# Patient Record
Sex: FEMALE | Race: WHITE | NOT HISPANIC OR LATINO | Employment: OTHER | URBAN - METROPOLITAN AREA
[De-identification: names, ages, dates, MRNs, and addresses within clinical notes are randomized per-mention and may not be internally consistent; named-entity substitution may affect disease eponyms.]

---

## 2017-02-12 ENCOUNTER — APPOINTMENT (EMERGENCY)
Dept: RADIOLOGY | Facility: HOSPITAL | Age: 72
End: 2017-02-12
Payer: MEDICARE

## 2017-02-12 ENCOUNTER — GENERIC CONVERSION - ENCOUNTER (OUTPATIENT)
Dept: OTHER | Facility: OTHER | Age: 72
End: 2017-02-12

## 2017-02-12 ENCOUNTER — HOSPITAL ENCOUNTER (OUTPATIENT)
Facility: HOSPITAL | Age: 72
Setting detail: OBSERVATION
Discharge: HOME | End: 2017-02-13
Attending: EMERGENCY MEDICINE | Admitting: INTERNAL MEDICINE
Payer: MEDICARE

## 2017-02-12 DIAGNOSIS — R07.9 CHEST PAIN: Primary | ICD-10-CM

## 2017-02-12 PROBLEM — E78.5 HLD (HYPERLIPIDEMIA): Status: ACTIVE | Noted: 2017-02-12

## 2017-02-12 PROBLEM — I10 ESSENTIAL HYPERTENSION: Status: ACTIVE | Noted: 2017-02-12

## 2017-02-12 PROBLEM — R07.89 CHEST TIGHTNESS: Status: ACTIVE | Noted: 2017-02-12

## 2017-02-12 PROBLEM — Z90.5 S/P NEPHRECTOMY: Status: ACTIVE | Noted: 2017-02-12

## 2017-02-12 PROBLEM — K21.9 GERD (GASTROESOPHAGEAL REFLUX DISEASE): Status: ACTIVE | Noted: 2017-02-12

## 2017-02-12 LAB
ANION GAP SERPL CALCULATED.3IONS-SCNC: 9 MMOL/L (ref 4–13)
BASOPHILS # BLD AUTO: 0.04 THOUSANDS/ΜL (ref 0–0.1)
BASOPHILS NFR BLD AUTO: 1 % (ref 0–1)
BUN SERPL-MCNC: 29 MG/DL (ref 5–25)
CALCIUM SERPL-MCNC: 8.8 MG/DL (ref 8.3–10.1)
CHLORIDE SERPL-SCNC: 108 MMOL/L (ref 100–108)
CO2 SERPL-SCNC: 26 MMOL/L (ref 21–32)
CREAT SERPL-MCNC: 1.32 MG/DL (ref 0.6–1.3)
EOSINOPHIL # BLD AUTO: 0.13 THOUSAND/ΜL (ref 0–0.61)
EOSINOPHIL NFR BLD AUTO: 3 % (ref 0–6)
ERYTHROCYTE [DISTWIDTH] IN BLOOD BY AUTOMATED COUNT: 13.7 % (ref 11.6–15.1)
GFR SERPL CREATININE-BSD FRML MDRD: 39.7 ML/MIN/1.73SQ M
GLUCOSE SERPL-MCNC: 116 MG/DL (ref 65–140)
HCT VFR BLD AUTO: 39.5 % (ref 34.8–46.1)
HGB BLD-MCNC: 13.1 G/DL (ref 11.5–15.4)
LYMPHOCYTES # BLD AUTO: 1.86 THOUSANDS/ΜL (ref 0.6–4.47)
LYMPHOCYTES NFR BLD AUTO: 42 % (ref 14–44)
MCH RBC QN AUTO: 30.7 PG (ref 26.8–34.3)
MCHC RBC AUTO-ENTMCNC: 33.2 G/DL (ref 31.4–37.4)
MCV RBC AUTO: 93 FL (ref 82–98)
MONOCYTES # BLD AUTO: 0.27 THOUSAND/ΜL (ref 0.17–1.22)
MONOCYTES NFR BLD AUTO: 6 % (ref 4–12)
NEUTROPHILS # BLD AUTO: 2.1 THOUSANDS/ΜL (ref 1.85–7.62)
NEUTS SEG NFR BLD AUTO: 48 % (ref 43–75)
NRBC BLD AUTO-RTO: 0 /100 WBCS
PLATELET # BLD AUTO: 268 THOUSANDS/UL (ref 149–390)
PLATELET # BLD AUTO: 273 THOUSANDS/UL (ref 149–390)
PMV BLD AUTO: 10.1 FL (ref 8.9–12.7)
PMV BLD AUTO: 10.3 FL (ref 8.9–12.7)
POTASSIUM SERPL-SCNC: 3.9 MMOL/L (ref 3.5–5.3)
RBC # BLD AUTO: 4.27 MILLION/UL (ref 3.81–5.12)
SODIUM SERPL-SCNC: 143 MMOL/L (ref 136–145)
SPECIMEN SOURCE: NORMAL
TROPONIN I BLD-MCNC: 0.01 NG/ML (ref 0–0.08)
TROPONIN I SERPL-MCNC: <0.02 NG/ML
TROPONIN I SERPL-MCNC: <0.02 NG/ML
WBC # BLD AUTO: 4.4 THOUSAND/UL (ref 4.31–10.16)

## 2017-02-12 PROCEDURE — 80048 BASIC METABOLIC PNL TOTAL CA: CPT

## 2017-02-12 PROCEDURE — 36415 COLL VENOUS BLD VENIPUNCTURE: CPT

## 2017-02-12 PROCEDURE — 85025 COMPLETE CBC W/AUTO DIFF WBC: CPT | Performed by: EMERGENCY MEDICINE

## 2017-02-12 PROCEDURE — 85049 AUTOMATED PLATELET COUNT: CPT | Performed by: INTERNAL MEDICINE

## 2017-02-12 PROCEDURE — 84484 ASSAY OF TROPONIN QUANT: CPT | Performed by: INTERNAL MEDICINE

## 2017-02-12 PROCEDURE — 84484 ASSAY OF TROPONIN QUANT: CPT

## 2017-02-12 PROCEDURE — 99285 EMERGENCY DEPT VISIT HI MDM: CPT

## 2017-02-12 PROCEDURE — 93005 ELECTROCARDIOGRAM TRACING: CPT

## 2017-02-12 PROCEDURE — 71020 HB CHEST X-RAY 2VW FRONTAL&LATL: CPT

## 2017-02-12 RX ORDER — PANTOPRAZOLE SODIUM 40 MG/1
40 TABLET, DELAYED RELEASE ORAL
Status: DISCONTINUED | OUTPATIENT
Start: 2017-02-12 | End: 2017-02-13 | Stop reason: HOSPADM

## 2017-02-12 RX ORDER — ASPIRIN 81 MG/1
324 TABLET, CHEWABLE ORAL ONCE
Status: COMPLETED | OUTPATIENT
Start: 2017-02-12 | End: 2017-02-12

## 2017-02-12 RX ORDER — LISINOPRIL 10 MG/1
10 TABLET ORAL 2 TIMES DAILY
Status: ON HOLD | COMMUNITY
End: 2017-02-13 | Stop reason: CLARIF

## 2017-02-12 RX ORDER — ASPIRIN 81 MG/1
81 TABLET ORAL DAILY
COMMUNITY
End: 2020-01-08

## 2017-02-12 RX ORDER — HEPARIN SODIUM 5000 [USP'U]/ML
5000 INJECTION, SOLUTION INTRAVENOUS; SUBCUTANEOUS EVERY 8 HOURS SCHEDULED
Status: DISCONTINUED | OUTPATIENT
Start: 2017-02-12 | End: 2017-02-13 | Stop reason: HOSPADM

## 2017-02-12 RX ORDER — ACETAMINOPHEN 325 MG/1
650 TABLET ORAL EVERY 6 HOURS PRN
Status: DISCONTINUED | OUTPATIENT
Start: 2017-02-12 | End: 2017-02-13 | Stop reason: HOSPADM

## 2017-02-12 RX ORDER — LANOLIN ALCOHOL/MO/W.PET/CERES
1000 CREAM (GRAM) TOPICAL DAILY
Status: ON HOLD | COMMUNITY
End: 2017-02-13 | Stop reason: CLARIF

## 2017-02-12 RX ORDER — SIMVASTATIN 10 MG
10 TABLET ORAL
Status: ON HOLD | COMMUNITY
End: 2017-02-13 | Stop reason: CLARIF

## 2017-02-12 RX ORDER — MAGNESIUM HYDROXIDE/ALUMINUM HYDROXICE/SIMETHICONE 120; 1200; 1200 MG/30ML; MG/30ML; MG/30ML
30 SUSPENSION ORAL EVERY 4 HOURS PRN
Status: DISCONTINUED | OUTPATIENT
Start: 2017-02-12 | End: 2017-02-13 | Stop reason: HOSPADM

## 2017-02-12 RX ORDER — INDOMETHACIN 50 MG/1
50 CAPSULE ORAL 3 TIMES DAILY
Status: ON HOLD | COMMUNITY
End: 2017-02-13 | Stop reason: CLARIF

## 2017-02-12 RX ADMIN — PANTOPRAZOLE SODIUM 40 MG: 40 TABLET, DELAYED RELEASE ORAL at 14:20

## 2017-02-12 RX ADMIN — ASPIRIN 81 MG 324 MG: 81 TABLET ORAL at 11:18

## 2017-02-13 VITALS
HEIGHT: 66 IN | SYSTOLIC BLOOD PRESSURE: 145 MMHG | BODY MASS INDEX: 28.93 KG/M2 | HEART RATE: 86 BPM | RESPIRATION RATE: 17 BRPM | OXYGEN SATURATION: 99 % | DIASTOLIC BLOOD PRESSURE: 63 MMHG | WEIGHT: 180 LBS | TEMPERATURE: 98 F

## 2017-02-13 LAB
ATRIAL RATE: 78 BPM
CHOLEST SERPL-MCNC: 207 MG/DL (ref 50–200)
EST. AVERAGE GLUCOSE BLD GHB EST-MCNC: 100 MG/DL
HBA1C MFR BLD: 5.1 % (ref 4.2–6.3)
HDLC SERPL-MCNC: 55 MG/DL (ref 40–60)
LDLC SERPL CALC-MCNC: 115 MG/DL (ref 0–100)
P AXIS: 76 DEGREES
PR INTERVAL: 154 MS
QRS AXIS: 52 DEGREES
QRSD INTERVAL: 128 MS
QT INTERVAL: 438 MS
QTC INTERVAL: 499 MS
T WAVE AXIS: -1 DEGREES
TRIGL SERPL-MCNC: 185 MG/DL
TROPONIN I SERPL-MCNC: <0.02 NG/ML
VENTRICULAR RATE: 78 BPM

## 2017-02-13 PROCEDURE — 83036 HEMOGLOBIN GLYCOSYLATED A1C: CPT | Performed by: INTERNAL MEDICINE

## 2017-02-13 PROCEDURE — 80061 LIPID PANEL: CPT | Performed by: INTERNAL MEDICINE

## 2017-02-13 RX ORDER — HYDRALAZINE HYDROCHLORIDE 20 MG/ML
5 INJECTION INTRAMUSCULAR; INTRAVENOUS EVERY 6 HOURS PRN
Status: DISCONTINUED | OUTPATIENT
Start: 2017-02-13 | End: 2017-02-13 | Stop reason: HOSPADM

## 2017-02-13 RX ORDER — SENNA PLUS 8.6 MG/1
1 TABLET ORAL DAILY
COMMUNITY
End: 2018-02-21

## 2017-02-13 RX ORDER — AMLODIPINE BESYLATE 5 MG/1
5 TABLET ORAL DAILY
COMMUNITY

## 2017-02-13 RX ORDER — ACETAMINOPHEN 325 MG/1
650 TABLET ORAL EVERY 6 HOURS PRN
Qty: 30 TABLET | Refills: 0
Start: 2017-02-13 | End: 2019-05-09

## 2017-02-13 RX ORDER — EZETIMIBE 10 MG/1
10 TABLET ORAL DAILY
COMMUNITY
End: 2019-06-19 | Stop reason: ALTCHOICE

## 2017-02-13 RX ORDER — PANTOPRAZOLE SODIUM 40 MG/1
40 TABLET, DELAYED RELEASE ORAL DAILY
COMMUNITY
End: 2018-02-21

## 2017-02-13 RX ADMIN — PANTOPRAZOLE SODIUM 40 MG: 40 TABLET, DELAYED RELEASE ORAL at 06:29

## 2017-02-13 RX ADMIN — HYDRALAZINE HYDROCHLORIDE 5 MG: 20 INJECTION INTRAMUSCULAR; INTRAVENOUS at 00:18

## 2017-02-13 RX ADMIN — HEPARIN SODIUM 5000 UNITS: 5000 INJECTION, SOLUTION INTRAVENOUS; SUBCUTANEOUS at 06:29

## 2017-02-24 ENCOUNTER — ALLSCRIPTS OFFICE VISIT (OUTPATIENT)
Dept: OTHER | Facility: OTHER | Age: 72
End: 2017-02-24

## 2017-11-07 ENCOUNTER — OFFICE VISIT (OUTPATIENT)
Dept: URGENT CARE | Facility: CLINIC | Age: 72
End: 2017-11-07
Payer: MEDICARE

## 2017-11-07 PROCEDURE — 99203 OFFICE O/P NEW LOW 30 MIN: CPT

## 2017-11-07 PROCEDURE — G0463 HOSPITAL OUTPT CLINIC VISIT: HCPCS

## 2017-11-08 NOTE — PROGRESS NOTES
Assessment  1  Acute sinusitis (461 9) (J01 90)    Plan  Acute sinusitis    · Amoxicillin-Pot Clavulanate 875-125 MG Oral Tablet; TAKE 1 TABLET EVERY 12  HOURS DAILY   · Fluticasone Propionate 50 MCG/ACT Nasal Suspension; USE 2 SPRAYS IN EACH  NOSTRIL ONCE DAILY    Chief Complaint  1  Cough  Chief Complaint Free Text Note Form: Pt c/o a cough and swollen glands for a week  History of Present Illness  HPI: 24-year-old female here with complaint of cough and swollen glands to last week  States the cough is productive at times and has a lot of postnasal drip  Hospital Based Practices Required Assessment:   Abuse And Domestic Violence Screen    Yes, the patient is safe at home  -- The patient states no one is hurting them  Depression And Suicide Screen  No, the patient has not had thoughts of hurting themself  No, the patient has not felt depressed in the past 7 days  Prefered Language is  [place]  Primary Language is  English  Cough: Altagracia Bartlett presents with complaints of cough  Associated symptoms include runny nose,-- stuffy nose,-- sore throat-- and-- postnasal drainage, but-- no dyspnea,-- no wheezing,-- no chills,-- no fever,-- no myalgias,-- no pleuritic chest pain,-- no chest pain,-- no vomiting-- and-- no heartburn  Review of Systems  Focused-Female:   Constitutional: No fever, no chills, feels well, no tiredness, no recent weight gain or loss  ENT: sore throat-- and-- nasal discharge, but-- as noted in HPI  Cardiovascular: no complaints of slow or fast heart rate, no chest pain, no palpitations, no leg claudication or lower extremity edema  Respiratory: cough, but-- no shortness of breath-- and-- no wheezing  ROS Reviewed:   ROS reviewed  Active Problems  1  Arthritis (716 90) (M19 90)   2  Chest tightness (786 59) (R07 89)   3  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) (D05 10)   4   Elevated blood pressure reading without diagnosis of hypertension (796 2) (R03 0)   5  Hypercholesterolemia (272 0) (E78 00)   6  Hyperlipidemia (272 4) (E78 5)   7  Right bundle-branch block (426 4) (I45 10)   8  Visit for pre-operative examination (V72 84) (C62 396)    Past Medical History  1  History of Arthritis (V13 4)   2  History of Claustrophobia (300 29) (F40 240)   3  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) (D05 10)   4  History of abdominal pain (V13 89) (Z87 898)   5  History of diverticulitis of colon (V12 79) (Z87 19)   6  History of hypertension (V12 59) (Z86 79)  Active Problems And Past Medical History Reviewed: The active problems and past medical history were reviewed and updated today  Family History  Mother    1  No pertinent family history  Family History Reviewed: The family history was reviewed and updated today  Social History   · Being A Social Drinker   · Denied: History of Drug Use   · Marital History - Currently    · Never A Smoker  Social History Reviewed: The social history was reviewed and updated today  The social history was reviewed and is unchanged  Surgical History  1  History of Breast Surgery   2  History of Hysterectomy   3  History of Knee Arthroplasty   4  History of Knee Replacement   5  History of Total Abdominal Hysterectomy With Removal Of Both Ovaries  Surgical History Reviewed: The surgical history was reviewed and updated today  Current Meds   1  Alendronate Sodium 70 MG Oral Tablet; TAKE 1 TABLET Weekly; Therapy: (Recorded:41Cvq1000) to Recorded   2  AmLODIPine Besylate 5 MG Oral Tablet; Take 1 tablet daily; Therapy: (Recorded:24Feb2017) to Recorded   3  Aspirin 81 MG TABS; Take 1 tablet daily Recorded   4  Biotin TABS; Therapy: (Recorded:28Nov2016) to Recorded   5  Calcium CAPS; Therapy: (Recorded:24Feb2017) to Recorded   6  [employer]S Natural Fish Oil 1000 MG Oral Capsule; TAKE 2 CAPSULE Daily; Therapy: (Recorded:24Feb2017) to Recorded   7  Nedra CAPS;    Therapy: (Recorded:28Nov2016) to Recorded   8  Magnesium TABS; Therapy: (Recorded:28Nov2016) to Recorded   9  Metamucil POWD; USE AS DIRECTED Recorded   10  Pantoprazole Sodium 40 MG Oral Tablet Delayed Release; Take 1 tablet daily Recorded   11  Red Yeast Rice 600 MG Oral Capsule; TAKE 2 CAPSULE Daily; Therapy: (Recorded:28Nov2016) to Recorded   12  Turmeric CAPS; Therapy: (Recorded:28Nov2016) to Recorded   13  Vitamin D 2000 UNIT Oral Capsule; Therapy: (Recorded:28Nov2016) to Recorded   14  Zetia 10 MG Oral Tablet; Take 1 tablet daily; Therapy: (Recorded:28Nov2016) to Recorded  Medication List Reviewed: The medication list was reviewed and updated today  Allergies  1  No Known Drug Allergies    Vitals  Signs   Recorded: 21ZCW1400 05:06PM   Temperature: 97 2 F  Heart Rate: 88  Respiration: 18  Systolic: 924  Diastolic: 83  Height: 5 ft 6 in  Weight: 192 lb 10 90 oz  BMI Calculated: 31 1  BSA Calculated: 1 97  O2 Saturation: 97    Physical Exam    Constitutional   General appearance: No acute distress, well appearing and well nourished  Ears, Nose, Mouth, and Throat   External inspection of ears and nose: Normal     Otoscopic examination: Tympanic membranes translucent with normal light reflex  Canals patent without erythema  Nasal mucosa, septum, and turbinates: Abnormal   There was a mucoid discharge from both nares  The bilateral nasal mucosa was edematous  Oropharynx: Abnormal   The posterior pharynx was erythematous-- and-- Postnasal drip  Pulmonary   Respiratory effort: No increased work of breathing or signs of respiratory distress  Auscultation of lungs: Clear to auscultation  Cardiovascular   Auscultation of heart: Normal rate and rhythm, normal S1 and S2, without murmurs         Signatures   Electronically signed by : Carrie Mai, [facility]; Nov 7 2017  5:31PM EST                       (Author)    Electronically signed by : PETER Molina ; Nov 7 2017  7:13PM EST (Co-author)

## 2017-12-08 ENCOUNTER — ALLSCRIPTS OFFICE VISIT (OUTPATIENT)
Dept: OTHER | Facility: OTHER | Age: 72
End: 2017-12-08

## 2017-12-08 LAB
BILIRUB UR QL STRIP: NORMAL
GLUCOSE (HISTORICAL): NORMAL
HGB UR QL STRIP.AUTO: NORMAL
KETONES UR STRIP-MCNC: NORMAL MG/DL
LEUKOCYTE ESTERASE UR QL STRIP: NORMAL
NITRITE UR QL STRIP: NORMAL
PH UR STRIP.AUTO: 5.5 [PH]
PROT UR STRIP-MCNC: NORMAL MG/DL
SP GR UR STRIP.AUTO: 1.02
UROBILINOGEN UR QL STRIP.AUTO: 0.2

## 2018-01-14 VITALS
SYSTOLIC BLOOD PRESSURE: 138 MMHG | DIASTOLIC BLOOD PRESSURE: 70 MMHG | HEIGHT: 66 IN | WEIGHT: 181.25 LBS | HEART RATE: 68 BPM | BODY MASS INDEX: 29.13 KG/M2

## 2018-01-22 VITALS — WEIGHT: 190 LBS | TEMPERATURE: 98 F | HEIGHT: 66 IN | BODY MASS INDEX: 30.53 KG/M2 | RESPIRATION RATE: 16 BRPM

## 2018-02-21 ENCOUNTER — OFFICE VISIT (OUTPATIENT)
Dept: CARDIOLOGY CLINIC | Facility: CLINIC | Age: 73
End: 2018-02-21
Payer: MEDICARE

## 2018-02-21 VITALS
HEART RATE: 64 BPM | DIASTOLIC BLOOD PRESSURE: 60 MMHG | WEIGHT: 197 LBS | HEIGHT: 66 IN | SYSTOLIC BLOOD PRESSURE: 130 MMHG | BODY MASS INDEX: 31.66 KG/M2

## 2018-02-21 DIAGNOSIS — R07.89 CHEST TIGHTNESS: ICD-10-CM

## 2018-02-21 DIAGNOSIS — I10 ESSENTIAL HYPERTENSION: ICD-10-CM

## 2018-02-21 DIAGNOSIS — I45.10 RBBB: Primary | ICD-10-CM

## 2018-02-21 PROCEDURE — 99213 OFFICE O/P EST LOW 20 MIN: CPT | Performed by: INTERNAL MEDICINE

## 2018-02-21 RX ORDER — BIOTIN 10 MG
TABLET ORAL
COMMUNITY
End: 2020-01-08

## 2018-02-21 RX ORDER — ATENOLOL 25 MG/1
1 TABLET ORAL DAILY
COMMUNITY
Start: 2017-12-08

## 2018-02-21 RX ORDER — AMPICILLIN TRIHYDRATE 250 MG
2 CAPSULE ORAL DAILY
COMMUNITY
End: 2018-02-21

## 2018-02-21 RX ORDER — ASHW RT/MAG BRK/EPMD/THEAN/PHO 200-150 MG
2 TABLET ORAL DAILY
COMMUNITY

## 2018-02-21 RX ORDER — MULTIVIT-MIN/IRON/FOLIC ACID/K 18-600-40
CAPSULE ORAL
COMMUNITY
End: 2019-05-09

## 2018-02-21 RX ORDER — PRASTERONE (DHEA) 50 MG
CAPSULE ORAL
COMMUNITY
End: 2018-02-21

## 2018-02-21 RX ORDER — FLUTICASONE PROPIONATE 50 MCG
2 SPRAY, SUSPENSION (ML) NASAL DAILY
COMMUNITY
Start: 2017-11-07 | End: 2018-02-21

## 2018-02-21 NOTE — PROGRESS NOTES
Cardiology Follow Up    Jerardo Schwartz [facility]  1945  [id number]  [address], [place] [address]    1  RBBB     2  Essential hypertension     3  Chest tightness           Discussion/Summary: All of her assessed cardiac problems are stable  I have reviewed her medications and made no changes  No cardiac testing is ordered  She is getting a FLP soon and I would suggest low dose statin therapy if needed  RTO 1 year  Interval History:  She has not had any cardiac problems since her last OV  Unfortunately she is suffering from bursitis in her hip and is not active  Her weight is up from 181 to 197 since her last OV 2/24/2017  She did have an EST on 3/22/2017 which showed good exercise tolerance without ischemia  Her insurance is not covering Zetia any more, She has never been on statins  Patient Active Problem List   Diagnosis    Chest tightness    GERD (gastroesophageal reflux disease)    Essential hypertension    HLD (hyperlipidemia)    S/p nephrectomy    RBBB     Past Medical History:   Diagnosis Date    Claustrophobia     Diverticula of colon     Ductal carcinoma in situ of breast     Hyperlipidemia     Hypertension     Myocardial ischemia     Right bundle branch block      Social History     Social History    Marital status: /Civil Union     Spouse name: N/A    Number of children: N/A    Years of education: N/A     Occupational History    Not on file  Social History Main Topics    Smoking status: Never Smoker    Smokeless tobacco: Never Used    Alcohol use No    Drug use: No    Sexual activity: Not on file     Other Topics Concern    Not on file     Social History Narrative    No narrative on file      No family history on file    Past Surgical History:   Procedure Laterality Date    BREAST SURGERY      HYSTERECTOMY      KIDNEY SURGERY left kidney removal    REPLACEMENT TOTAL KNEE         Current Outpatient Prescriptions:     acetaminophen (TYLENOL) 325 mg tablet, Take 2 tablets by mouth every 6 (six) hours as needed for headaches, Disp: 30 tablet, Rfl: 0    ALENDRONATE SODIUM PO, Take 70 mg by mouth weekly , Disp: , Rfl:     amLODIPine (NORVASC) 5 mg tablet, Take 5 mg by mouth daily, Disp: , Rfl:     aspirin (ECOTRIN LOW STRENGTH) 81 mg EC tablet, Take 81 mg by mouth daily, Disp: , Rfl:     atenolol (TENORMIN) 25 mg tablet, Take 1 tablet by mouth daily, Disp: , Rfl:     Biotin 10 MG TABS, Take by mouth, Disp: , Rfl:     Cholecalciferol (VITAMIN D) 2000 units CAPS, Take by mouth, Disp: , Rfl:     ezetimibe (ZETIA) 10 mg tablet, Take 10 mg by mouth daily, Disp: , Rfl:     Ginger, Zingiber officinalis, (GINGER PO), Take 500 mg by mouth daily, Disp: , Rfl:     MAGNESIUM PO, Take 250 mg by mouth daily, Disp: , Rfl:     Omega-3 Fatty Acids ([employer]S NATURAL FISH OIL) 1000 MG CAPS, Take 2 capsules by mouth daily, Disp: , Rfl:     psyllium (METAMUCIL) 58 6 % packet, Take 1 packet by mouth daily, Disp: , Rfl:     Red Yeast Rice Extract (RED YEAST RICE PO), Take 2,400 mg by mouth daily, Disp: , Rfl:     TURMERIC PO, Take 538 mg by mouth daily, Disp: , Rfl:   Allergies   Allergen Reactions    Niacin Other (See Comments)     Other reaction(s): flushed prickly sensation    Penicillins        Labs:  No visits with results within 2 Month(s) from this visit  Latest known visit with results is:   [facility]   Component Date Value    Leukocytes, UA 12/08/2017 trace     Nitrite, UA 12/08/2017 neg     Blood, UA 12/08/2017 trace     Bilirubin, UA 12/08/2017 neg     Urobilinogen, UA 12/08/2017 0 2     Protein, UA 12/08/2017 30mg     pH, UA 12/08/2017 5 5     Specific Gravity, UA 12/08/2017 1 020     Ketones, UA 12/08/2017 trace     Glucose 12/08/2017 neg      Imaging: No results found      Review of Systems:  Review of Systems   Constitutional: Negative for diaphoresis, fatigue, fever and unexpected weight change  HENT: Negative  Respiratory: Negative for cough, shortness of breath and wheezing  Cardiovascular: Negative for chest pain, palpitations and leg swelling  Gastrointestinal: Negative for abdominal pain, diarrhea and nausea  Musculoskeletal: Negative for gait problem and myalgias  Skin: Negative for rash  Neurological: Negative for dizziness and numbness  Psychiatric/Behavioral: Negative  Physical Exam:  Physical Exam   Constitutional: She is oriented to person, place, and time  She appears well-developed and well-nourished  HENT:   Head: Normocephalic and atraumatic  Eyes: Pupils are equal, round, and reactive to light  Neck: Normal range of motion  Neck supple  No JVD present  Cardiovascular: Regular rhythm, S1 normal, S2 normal and normal pulses  Pulses:       Carotid pulses are 2+ on the right side, and 2+ on the left side  Pulmonary/Chest: Effort normal and breath sounds normal  She has no wheezes  She has no rales  Abdominal: Soft  Bowel sounds are normal  There is no tenderness  Musculoskeletal: Normal range of motion  She exhibits no edema or tenderness  Neurological: She is alert and oriented to person, place, and time  She has normal reflexes  No cranial nerve deficit  Skin: Skin is warm  Psychiatric: She has a normal mood and affect

## 2018-06-14 ENCOUNTER — OFFICE VISIT (OUTPATIENT)
Dept: UROLOGY | Facility: MEDICAL CENTER | Age: 73
End: 2018-06-14
Payer: MEDICARE

## 2018-06-14 VITALS
HEIGHT: 66 IN | SYSTOLIC BLOOD PRESSURE: 130 MMHG | BODY MASS INDEX: 29.89 KG/M2 | WEIGHT: 186 LBS | DIASTOLIC BLOOD PRESSURE: 76 MMHG

## 2018-06-14 DIAGNOSIS — Z90.5 HISTORY OF NEPHRECTOMY: ICD-10-CM

## 2018-06-14 DIAGNOSIS — Z87.442 HISTORY OF NEPHROLITHIASIS: ICD-10-CM

## 2018-06-14 DIAGNOSIS — C64.2 MALIGNANT NEOPLASM OF LEFT KIDNEY (HCC): Primary | ICD-10-CM

## 2018-06-14 LAB
SL AMB  POCT GLUCOSE, UA: NORMAL
SL AMB LEUKOCYTE ESTERASE,UA: NORMAL
SL AMB POCT BILIRUBIN,UA: NORMAL
SL AMB POCT BLOOD,UA: NORMAL
SL AMB POCT CLARITY,UA: CLEAR
SL AMB POCT COLOR,UA: YELLOW
SL AMB POCT KETONES,UA: NORMAL
SL AMB POCT NITRITE,UA: NORMAL
SL AMB POCT PH,UA: 6
SL AMB POCT SPECIFIC GRAVITY,UA: 1.02
SL AMB POCT URINE PROTEIN: NORMAL
SL AMB POCT UROBILINOGEN: 0.2

## 2018-06-14 PROCEDURE — 81003 URINALYSIS AUTO W/O SCOPE: CPT | Performed by: UROLOGY

## 2018-06-14 PROCEDURE — 99214 OFFICE O/P EST MOD 30 MIN: CPT | Performed by: UROLOGY

## 2018-06-14 NOTE — PROGRESS NOTES
Assessment/Plan:      Diagnoses and all orders for this visit:    Malignant neoplasm of left kidney (HCC)  -     POCT urine dip auto non-scope    History of nephrectomy    History of nephrolithiasis          Subjective:  No complaints     Patient ID: Mckenzie Burroughs is a 68 y o  female  HPI  She is 1 and 1/2 years after her laparoscopic robotic left radical nephrectomy for pT1a RCC  No problems or complaints to report  She is not having any flank pain and has a good appetite with bowels moving normally  She is not having pain and any new locations and has not had any unwanted weight loss  Her last radiologic imaging studies were approximately 6 months ago  She has a history of nephrolithiasis on the right side, and she has had no interval complaints concerning that history  She denies any flank pain, or hematuria  Review of Systems   Constitutional: Negative  HENT: Negative  Eyes: Negative  Respiratory: Negative  Cardiovascular: Negative  Gastrointestinal: Negative  Endocrine: Negative  Genitourinary: Negative  Musculoskeletal: Negative  Skin: Negative  Allergic/Immunologic: Negative  Neurological: Negative  Hematological: Negative  Psychiatric/Behavioral: Negative  Objective:     Physical Exam   Constitutional: She is oriented to person, place, and time  She appears well-developed and well-nourished  No distress  HENT:   Head: Normocephalic and atraumatic  Nose: Nose normal    Mouth/Throat: Oropharynx is clear and moist    Eyes: Conjunctivae are normal  Pupils are equal, round, and reactive to light  No scleral icterus  Neck: Normal range of motion  Neck supple  Cardiovascular: Normal rate  Pulmonary/Chest: Effort normal and breath sounds normal  No respiratory distress  Abdominal: Soft  Bowel sounds are normal  She exhibits no distension and no mass  There is no tenderness  Musculoskeletal: Normal range of motion   She exhibits no edema or tenderness  Lymphadenopathy:     She has no cervical adenopathy  Neurological: She is alert and oriented to person, place, and time  Skin: Skin is warm and dry  No rash noted  No erythema  No pallor  Psychiatric: She has a normal mood and affect  Her behavior is normal  Judgment and thought content normal    Nursing note and vitals reviewed  CT scan of the abdomen pelvis from 12/4/2017  Result Impression   Impression:   1  Left nephrectomy  No evidence of tumoral recurrence or metastatic disease in  the abdomen or pelvis  2  Right renal cyst (2 3 cm)  Small right intrarenal calculus  3  Sigmoid colon diverticulosis  4  Cholelithiasis

## 2018-06-20 LAB
ALBUMIN SERPL BCP-MCNC: 4.1 G/DL (ref 3.5–5.7)
ALP SERPL-CCNC: 32 IU/L (ref 55–165)
ALT SERPL W P-5'-P-CCNC: 18 IU/L (ref 9–28)
ANION GAP SERPL CALCULATED.3IONS-SCNC: 13.5 MM/L
AST SERPL W P-5'-P-CCNC: 17 U/L (ref 7–26)
BASOPHILS # BLD AUTO: 0 X3/UL (ref 0–0.3)
BASOPHILS # BLD AUTO: 1.2 % (ref 0–2)
BILIRUB SERPL-MCNC: 0.7 MG/DL (ref 0.3–1)
BUN SERPL-MCNC: 22 MG/DL (ref 7–25)
CALCIUM SERPL-MCNC: 9.3 MG/DL (ref 8.6–10.5)
CHLORIDE SERPL-SCNC: 107 MM/L (ref 98–107)
CHOLEST SERPL-MCNC: 255 MG/DL (ref 0–200)
CO2 SERPL-SCNC: 27 MM/L (ref 21–31)
CREAT SERPL-MCNC: 1.29 MG/DL (ref 0.6–1.2)
DEPRECATED RDW RBC AUTO: 14.4 %
EGFR (HISTORICAL): 41 GFR
EGFR AFRICAN AMERICAN (HISTORICAL): 49 GFR
EOSINOPHIL # BLD AUTO: 0.1 X3/UL (ref 0–0.5)
EOSINOPHIL NFR BLD AUTO: 3 % (ref 0–5)
EST. AVERAGE GLUCOSE BLD GHB EST-MCNC: 111 MG/DL
GLUCOSE (HISTORICAL): 114 MG/DL (ref 65–99)
HBA1C MFR BLD HPLC: 5.5 % (ref 4–6.2)
HCT VFR BLD AUTO: 40.3 % (ref 37–47)
HDLC SERPL-MCNC: 42 MG/DL (ref 40–60)
HGB BLD-MCNC: 13.7 G/DL (ref 12–16)
LDLC SERPL CALC-MCNC: 179.2 MG/DL (ref 75–193)
LYMPHOCYTES # BLD AUTO: 1.7 X3/UL (ref 1.2–4.2)
LYMPHOCYTES NFR BLD AUTO: 41.1 % (ref 20.5–51.1)
MCH RBC QN AUTO: 32.3 PG (ref 26–34)
MCHC RBC AUTO-ENTMCNC: 33.9 G/DL (ref 31–37)
MCV RBC AUTO: 95.3 FL (ref 81–99)
MONOCYTES # BLD AUTO: 0.4 X3/UL (ref 0–1)
MONOCYTES NFR BLD AUTO: 10.4 % (ref 1.7–12)
NEUTROPHILS # BLD AUTO: 1.8 X3/UL (ref 1.4–6.5)
NEUTS SEG NFR BLD AUTO: 44.3 % (ref 42.2–75.2)
OSMOLALITY, SERUM (HISTORICAL): 289 MOSM (ref 262–291)
PLATELET # BLD AUTO: 275 X3/UL (ref 130–400)
PMV BLD AUTO: 9 FL
POTASSIUM SERPL-SCNC: 4.5 MM/L (ref 3.5–5.5)
RBC # BLD AUTO: 4.23 X6/UL (ref 3.9–5.2)
SODIUM SERPL-SCNC: 143 MM/L (ref 134–143)
TOTAL PROTEIN (HISTORICAL): 6.4 G/DL (ref 6.4–8.9)
TRIGL SERPL-MCNC: 168 MG/DL (ref 44–166)
TSH SERPL DL<=0.05 MIU/L-ACNC: 2.77 UIU/M (ref 0.45–5.33)
VLDL CHOLESTEROL (HISTORICAL): 34 MG/DL (ref 5–51)
WBC # BLD AUTO: 4 X3/UL (ref 4.8–10.8)

## 2018-09-10 ENCOUNTER — TRANSCRIBE ORDERS (OUTPATIENT)
Dept: ADMINISTRATIVE | Facility: HOSPITAL | Age: 73
End: 2018-09-10

## 2018-09-10 ENCOUNTER — APPOINTMENT (OUTPATIENT)
Dept: LAB | Facility: HOSPITAL | Age: 73
End: 2018-09-10
Payer: MEDICARE

## 2018-09-10 DIAGNOSIS — I10 ESSENTIAL HYPERTENSION: ICD-10-CM

## 2018-09-10 DIAGNOSIS — C50.912 MALIGNANT NEOPLASM OF LEFT FEMALE BREAST, UNSPECIFIED ESTROGEN RECEPTOR STATUS, UNSPECIFIED SITE OF BREAST (HCC): ICD-10-CM

## 2018-09-10 DIAGNOSIS — E78.2 MIXED HYPERLIPIDEMIA: ICD-10-CM

## 2018-09-10 DIAGNOSIS — R73.01 IMPAIRED FASTING GLUCOSE: ICD-10-CM

## 2018-09-10 DIAGNOSIS — C50.912 MALIGNANT NEOPLASM OF LEFT FEMALE BREAST, UNSPECIFIED ESTROGEN RECEPTOR STATUS, UNSPECIFIED SITE OF BREAST (HCC): Primary | ICD-10-CM

## 2018-09-10 LAB
ALBUMIN SERPL BCP-MCNC: 4 G/DL (ref 3.5–5.7)
ALP SERPL-CCNC: 39 U/L (ref 55–165)
ALT SERPL W P-5'-P-CCNC: 16 U/L (ref 7–52)
ANION GAP SERPL CALCULATED.3IONS-SCNC: 7 MMOL/L (ref 4–13)
AST SERPL W P-5'-P-CCNC: 14 U/L (ref 13–39)
BASOPHILS # BLD AUTO: 0.1 THOUSANDS/ΜL (ref 0–0.1)
BASOPHILS NFR BLD AUTO: 1 % (ref 0–1)
BILIRUB SERPL-MCNC: 0.6 MG/DL (ref 0.2–1)
BUN SERPL-MCNC: 33 MG/DL (ref 7–25)
CALCIUM SERPL-MCNC: 9.9 MG/DL (ref 8.6–10.5)
CHLORIDE SERPL-SCNC: 105 MMOL/L (ref 98–107)
CHOLEST SERPL-MCNC: 285 MG/DL (ref 0–200)
CO2 SERPL-SCNC: 28 MMOL/L (ref 21–31)
CREAT SERPL-MCNC: 1.31 MG/DL (ref 0.6–1.2)
EOSINOPHIL # BLD AUTO: 0.1 THOUSAND/ΜL (ref 0–0.61)
EOSINOPHIL NFR BLD AUTO: 2 % (ref 0–6)
ERYTHROCYTE [DISTWIDTH] IN BLOOD BY AUTOMATED COUNT: 14.2 % (ref 11.6–15.1)
EST. AVERAGE GLUCOSE BLD GHB EST-MCNC: 108 MG/DL
GFR SERPL CREATININE-BSD FRML MDRD: 40 ML/MIN/1.73SQ M
GLUCOSE P FAST SERPL-MCNC: 111 MG/DL (ref 65–99)
HBA1C MFR BLD: 5.4 % (ref 4.2–6.3)
HCT VFR BLD AUTO: 40.6 % (ref 37–47)
HDLC SERPL-MCNC: 48 MG/DL (ref 40–60)
HGB BLD-MCNC: 13.6 G/DL (ref 11.5–15.4)
LDLC SERPL CALC-MCNC: 202 MG/DL (ref 0–100)
LYMPHOCYTES # BLD AUTO: 1.7 THOUSANDS/ΜL (ref 0.6–4.47)
LYMPHOCYTES NFR BLD AUTO: 32 % (ref 14–44)
MCH RBC QN AUTO: 31.9 PG (ref 26.8–34.3)
MCHC RBC AUTO-ENTMCNC: 33.4 G/DL (ref 31.4–37.4)
MCV RBC AUTO: 95 FL (ref 82–98)
MONOCYTES # BLD AUTO: 0.5 THOUSAND/ΜL (ref 0.17–1.22)
MONOCYTES NFR BLD AUTO: 9 % (ref 4–12)
NEUTROPHILS # BLD AUTO: 2.9 THOUSANDS/ΜL (ref 1.85–7.62)
NEUTS SEG NFR BLD AUTO: 56 % (ref 43–75)
NONHDLC SERPL-MCNC: 237 MG/DL
NRBC BLD AUTO-RTO: 0 /100 WBCS
PLATELET # BLD AUTO: 258 THOUSANDS/UL (ref 149–390)
PMV BLD AUTO: 9.2 FL (ref 8.9–12.7)
POTASSIUM SERPL-SCNC: 4.3 MMOL/L (ref 3.5–5.5)
PROT SERPL-MCNC: 6.4 G/DL (ref 6.4–8.9)
RBC # BLD AUTO: 4.26 MILLION/UL (ref 3.81–5.12)
SODIUM SERPL-SCNC: 140 MMOL/L (ref 134–143)
TRIGL SERPL-MCNC: 176 MG/DL (ref 44–166)
TSH SERPL DL<=0.05 MIU/L-ACNC: 2.09 UIU/ML (ref 0.45–5.33)
WBC # BLD AUTO: 5.2 THOUSAND/UL (ref 4.31–10.16)

## 2018-09-10 PROCEDURE — 83036 HEMOGLOBIN GLYCOSYLATED A1C: CPT

## 2018-09-10 PROCEDURE — 85025 COMPLETE CBC W/AUTO DIFF WBC: CPT

## 2018-09-10 PROCEDURE — 80061 LIPID PANEL: CPT

## 2018-09-10 PROCEDURE — 36415 COLL VENOUS BLD VENIPUNCTURE: CPT

## 2018-09-10 PROCEDURE — 84443 ASSAY THYROID STIM HORMONE: CPT

## 2018-09-10 PROCEDURE — 80053 COMPREHEN METABOLIC PANEL: CPT

## 2018-09-26 ENCOUNTER — OFFICE VISIT (OUTPATIENT)
Dept: CARDIOLOGY CLINIC | Facility: CLINIC | Age: 73
End: 2018-09-26
Payer: MEDICARE

## 2018-09-26 VITALS
HEART RATE: 52 BPM | BODY MASS INDEX: 28.87 KG/M2 | SYSTOLIC BLOOD PRESSURE: 128 MMHG | DIASTOLIC BLOOD PRESSURE: 66 MMHG | WEIGHT: 179.6 LBS | HEIGHT: 66 IN

## 2018-09-26 DIAGNOSIS — I45.10 RBBB: Primary | ICD-10-CM

## 2018-09-26 DIAGNOSIS — E78.2 MIXED HYPERLIPIDEMIA: ICD-10-CM

## 2018-09-26 DIAGNOSIS — I10 ESSENTIAL HYPERTENSION: ICD-10-CM

## 2018-09-26 DIAGNOSIS — R07.89 CHEST TIGHTNESS: ICD-10-CM

## 2018-09-26 DIAGNOSIS — Z01.810 PREOP CARDIOVASCULAR EXAM: ICD-10-CM

## 2018-09-26 PROCEDURE — 99213 OFFICE O/P EST LOW 20 MIN: CPT | Performed by: INTERNAL MEDICINE

## 2018-09-26 PROCEDURE — 93000 ELECTROCARDIOGRAM COMPLETE: CPT | Performed by: INTERNAL MEDICINE

## 2018-09-26 NOTE — PROGRESS NOTES
Cardiology Follow Up    Sandoval Escort [facility]  1945  [id number]  [address] [facility] [place] [place] [address] [id number] [phone]    1  RBBB  POCT ECG   2  Essential hypertension     3  Chest tightness     4  Mixed hyperlipidemia     5  Preop cardiovascular exam           Discussion/Summary: All of her assessed cardiac problems are stable  I have reviewed her medications and made no changes  No cardiac testing is needed at this time  She is cleared for cataract surgery  RTO 1 year  Interval History: She has not had any cardiac problems since her last OV  She denies CP, SOB, palpitations  Her recent FLP showed a LDL of 202  Her PCP sent in a prescription to her mail order for medication which I assume is a statin  ECG shows a RBBB which is not new for her   HR is 52 BPM     Patient Active Problem List   Diagnosis    Chest tightness    GERD (gastroesophageal reflux disease)    Essential hypertension    HLD (hyperlipidemia)    S/p nephrectomy    RBBB    Preop cardiovascular exam     Past Medical History:   Diagnosis Date    Arthritis     Back pain     [facility] [address] [facility]     Cancer of kidney [facility] [address] [facility]     Claustrophobia     Diverticula of colon     Ductal carcinoma in situ of breast     Hyperlipidemia     Hypertension     Kidney stone     Myocardial ischemia     Renal mass     Right bundle branch block     Stress incontinence      Social History     Social History    Marital status: /Civil Union     Spouse name: N/A    Number of children: N/A    Years of education: N/A     Occupational History    Not on file  Social History Main Topics    Smoking status: Never Smoker    Smokeless tobacco: Never Used    Alcohol use No    Drug use: No    Sexual activity: Not on file     Other Topics Concern    Not on file     Social History Narrative    No narrative on file      History reviewed   No pertinent family history  Past Surgical History:   Procedure Laterality Date    BREAST SURGERY      HYSTERECTOMY      KIDNEY SURGERY      left kidney removal    REPLACEMENT TOTAL KNEE         Current Outpatient Prescriptions:     acetaminophen (TYLENOL) 325 mg tablet, Take 2 tablets by mouth every 6 (six) hours as needed for headaches, Disp: 30 tablet, Rfl: 0    ALENDRONATE SODIUM PO, Take 70 mg by mouth weekly , Disp: , Rfl:     amLODIPine (NORVASC) 5 mg tablet, Take 5 mg by mouth daily, Disp: , Rfl:     aspirin (ECOTRIN LOW STRENGTH) 81 mg EC tablet, Take 81 mg by mouth daily, Disp: , Rfl:     atenolol (TENORMIN) 25 mg tablet, Take 1 tablet by mouth daily, Disp: , Rfl:     Biotin 10 MG TABS, Take by mouth, Disp: , Rfl:     Cholecalciferol (VITAMIN D) 2000 units CAPS, Take by mouth, Disp: , Rfl:     Ginger, Zingiber officinalis, (GINGER PO), Take 500 mg by mouth daily, Disp: , Rfl:     MAGNESIUM PO, Take 250 mg by mouth daily, Disp: , Rfl:     Omega-3 Fatty Acids ([employer]S NATURAL FISH OIL) 1000 MG CAPS, Take 2 capsules by mouth daily, Disp: , Rfl:     psyllium (METAMUCIL) 58 6 % packet, Take 1 packet by mouth daily, Disp: , Rfl:     Red Yeast Rice Extract (RED YEAST RICE PO), Take 2,400 mg by mouth daily, Disp: , Rfl:     TURMERIC PO, Take 538 mg by mouth daily, Disp: , Rfl:     ezetimibe (ZETIA) 10 mg tablet, Take 10 mg by mouth daily, Disp: , Rfl:   Allergies   Allergen Reactions    Niacin Other (See Comments)     Other reaction(s):  Other (See Comments)  Other reaction(s): flushed prickly sensation  Other reaction(s): flushed prickly sensation  Other reaction(s): flushed prickly sensation    Penicillins      Vitals:    09/26/18 0850   BP: 128/66   BP Location: Left arm   Patient Position: Sitting   Cuff Size: Adult   Pulse: (!) 52   Weight: 81 5 kg (179 lb 9 6 oz)   Height: 5' 6" (1 676 m)     Weight (last 2 days)     Date/Time   Weight    09/26/18 0850  81 5 (179 6)             Blood pressure 128/66, pulse (!) 52, height 5' 6" (1 676 m), weight 81 5 kg (179 lb 9 6 oz)  , Body mass index is 28 99 kg/m²      Labs:  Appointment on 09/10/2018   Component Date Value    WBC 09/10/2018 5 20     RBC 09/10/2018 4 26     Hemoglobin 09/10/2018 13 6     Hematocrit 09/10/2018 40 6     MCV 09/10/2018 95     MCH 09/10/2018 31 9     MCHC 09/10/2018 33 4     RDW 09/10/2018 14 2     MPV 09/10/2018 9 2     Platelets 47/88/9562 258     nRBC 09/10/2018 0     Neutrophils Relative 09/10/2018 56     Lymphocytes Relative 09/10/2018 32     Monocytes Relative 09/10/2018 9     Eosinophils Relative 09/10/2018 2     Basophils Relative 09/10/2018 1     Neutrophils Absolute 09/10/2018 2 90     Lymphocytes Absolute 09/10/2018 1 70     Monocytes Absolute 09/10/2018 0 50     Eosinophils Absolute 09/10/2018 0 10     Basophils Absolute 09/10/2018 0 10     Sodium 09/10/2018 140     Potassium 09/10/2018 4 3     Chloride 09/10/2018 105     CO2 09/10/2018 28     ANION GAP 09/10/2018 7     BUN 09/10/2018 33*    Creatinine 09/10/2018 1 31*    Glucose, Fasting 09/10/2018 111*    Calcium 09/10/2018 9 9     AST 09/10/2018 14     ALT 09/10/2018 16     Alkaline Phosphatase 09/10/2018 39*    Total Protein 09/10/2018 6 4     Albumin 09/10/2018 4 0     Total Bilirubin 09/10/2018 0 60     eGFR 09/10/2018 40     Hemoglobin A1C 09/10/2018 5 4     EAG 09/10/2018 108     Cholesterol 09/10/2018 285*    Triglycerides 09/10/2018 176*    HDL, Direct 09/10/2018 48     LDL Calculated 09/10/2018 202*    Non-HDL-Chol (CHOL-HDL) 09/10/2018 237     TSH 3RD GENERATON 09/10/2018 2 090    Orders Only on 06/20/2018   Component Date Value    WBC 06/20/2018 4 0*    RBC 06/20/2018 4 23     Hemoglobin 06/20/2018 13 7     Hematocrit 06/20/2018 40 3     MCV 06/20/2018 95 3     MCH 06/20/2018 32 3     MCHC 06/20/2018 33 9     RDW 06/20/2018 14 4     Platelets 02/40/2719 275     MPV 06/20/2018 9 0     Neutrophils Relative 06/20/2018 44 3     Lymphocytes Relative 06/20/2018 41 1     Monocytes Relative 06/20/2018 10 4     Eosinophils Relative 06/20/2018 3 0     Basophils Relative 06/20/2018 1 2     Neutrophils Absolute 06/20/2018 1 8     Lymphocytes Absolute 06/20/2018 1 7     Monocytes Absolute 06/20/2018 0 4     Eosinophils Absolute 06/20/2018 0 1     Basophils Absolute 06/20/2018 0 0     Glucose 06/20/2018 114*    BUN 06/20/2018 22     Creatinine 06/20/2018 1 29*    Sodium 06/20/2018 143     Potassium 06/20/2018 4 5     Chloride 06/20/2018 107     CO2 06/20/2018 27     Calcium 06/20/2018 9 3     Anion Gap 06/20/2018 13 5     OSMOLALITY, SERUM 06/20/2018 289     Total Protein 06/20/2018 6 4     Albumin 06/20/2018 4 1     Total Bilirubin 06/20/2018 0 7     AST 06/20/2018 17     ALT 06/20/2018 18     Alkaline Phosphatase 06/20/2018 32*    EGFR (HISTORICAL) 06/20/2018 41*    eGFR  06/20/2018 49*    Cholesterol 06/20/2018 255*    Triglycerides 06/20/2018 168*    VLDL CHOLESTEROL 06/20/2018 34 0     HDL 06/20/2018 42     LDL Calculated 06/20/2018 179 2     TSH 3RD GENERATON 06/20/2018 2 77     Hemoglobin A1C 06/20/2018 5 5     EAG 06/20/2018 111    Office Visit on 06/14/2018   Component Date Value     COLOR,UA 06/14/2018 yellow      CLARITY,UA 06/14/2018 clear     SPECIFIC GRAVITY,UA 06/14/2018 1 020      PH,UA 06/14/2018 6 0     LEUKOCYTE ESTERASE,UA 06/14/2018 n     NITRITE,UA 06/14/2018 n     GLUCOSE, UA 06/14/2018 n     KETONES,UA 06/14/2018 n      BILIRUBIN,UA 06/14/2018 n      BLOOD,UA 06/14/2018 n     SL AMB POCT URINE PROTEIN 06/14/2018 n     SL AMB POCT UROBILINOGEN 06/14/2018 0 2      Imaging: No results found  EKG: Sinus Bradycardia  HR 52   RBBB  Review of Systems:  Review of Systems   Constitutional: Negative for diaphoresis, fatigue, fever and unexpected weight change  HENT: Negative  Respiratory: Negative for cough, shortness of breath and wheezing  Cardiovascular: Negative for chest pain, palpitations and leg swelling  Gastrointestinal: Negative for abdominal pain, diarrhea and nausea  Musculoskeletal: Negative for gait problem and myalgias  Skin: Negative for rash  Neurological: Negative for dizziness and numbness  Psychiatric/Behavioral: Negative  Physical Exam:  Physical Exam   Constitutional: She is oriented to person, place, and time  She appears well-developed and well-nourished  HENT:   Head: Normocephalic and atraumatic  Eyes: Pupils are equal, round, and reactive to light  Neck: Normal range of motion  Neck supple  No JVD present  Cardiovascular: Regular rhythm, S1 normal, S2 normal and normal pulses  Pulses:       Carotid pulses are 2+ on the right side, and 2+ on the left side  Pulmonary/Chest: Effort normal and breath sounds normal  She has no wheezes  She has no rales  Abdominal: Soft  Bowel sounds are normal  There is no tenderness  Musculoskeletal: Normal range of motion  She exhibits no edema or tenderness  Neurological: She is alert and oriented to person, place, and time  She has normal reflexes  No cranial nerve deficit  Skin: Skin is warm  Psychiatric: She has a normal mood and affect

## 2018-09-30 ENCOUNTER — HOSPITAL ENCOUNTER (EMERGENCY)
Facility: HOSPITAL | Age: 73
Discharge: HOME | End: 2018-09-30
Attending: EMERGENCY MEDICINE | Admitting: EMERGENCY MEDICINE
Payer: MEDICARE

## 2018-09-30 ENCOUNTER — APPOINTMENT (EMERGENCY)
Dept: RADIOLOGY | Facility: HOSPITAL | Age: 73
End: 2018-09-30
Payer: MEDICARE

## 2018-09-30 VITALS
DIASTOLIC BLOOD PRESSURE: 65 MMHG | BODY MASS INDEX: 28.93 KG/M2 | OXYGEN SATURATION: 100 % | SYSTOLIC BLOOD PRESSURE: 147 MMHG | TEMPERATURE: 98.1 F | HEART RATE: 58 BPM | HEIGHT: 66 IN | RESPIRATION RATE: 16 BRPM | WEIGHT: 180 LBS

## 2018-09-30 DIAGNOSIS — K21.9 GERD (GASTROESOPHAGEAL REFLUX DISEASE): Primary | ICD-10-CM

## 2018-09-30 LAB
ANION GAP SERPL CALCULATED.3IONS-SCNC: 10 MMOL/L (ref 4–13)
APTT PPP: 26 SECONDS (ref 24–36)
BASOPHILS # BLD AUTO: 0.1 THOUSANDS/ΜL (ref 0–0.1)
BASOPHILS NFR BLD AUTO: 1 % (ref 0–1)
BUN SERPL-MCNC: 29 MG/DL (ref 7–25)
CALCIUM SERPL-MCNC: 10.3 MG/DL (ref 8.6–10.5)
CHLORIDE SERPL-SCNC: 103 MMOL/L (ref 98–107)
CO2 SERPL-SCNC: 25 MMOL/L (ref 21–31)
CREAT SERPL-MCNC: 1.32 MG/DL (ref 0.6–1.2)
EOSINOPHIL # BLD AUTO: 0.1 THOUSAND/ΜL (ref 0–0.61)
EOSINOPHIL NFR BLD AUTO: 2 % (ref 0–6)
ERYTHROCYTE [DISTWIDTH] IN BLOOD BY AUTOMATED COUNT: 14.5 % (ref 11.6–15.1)
GFR SERPL CREATININE-BSD FRML MDRD: 40 ML/MIN/1.73SQ M
GLUCOSE SERPL-MCNC: 110 MG/DL (ref 65–140)
HCT VFR BLD AUTO: 40.8 % (ref 37–47)
HGB BLD-MCNC: 13.5 G/DL (ref 11.5–15.4)
INR PPP: 0.91 (ref 0.9–1.5)
LYMPHOCYTES # BLD AUTO: 3 THOUSANDS/ΜL (ref 0.6–4.47)
LYMPHOCYTES NFR BLD AUTO: 45 % (ref 14–44)
MCH RBC QN AUTO: 31.5 PG (ref 26.8–34.3)
MCHC RBC AUTO-ENTMCNC: 33.1 G/DL (ref 31.4–37.4)
MCV RBC AUTO: 95 FL (ref 82–98)
MONOCYTES # BLD AUTO: 0.6 THOUSAND/ΜL (ref 0.17–1.22)
MONOCYTES NFR BLD AUTO: 8 % (ref 4–12)
NEUTROPHILS # BLD AUTO: 2.9 THOUSANDS/ΜL (ref 1.85–7.62)
NEUTS SEG NFR BLD AUTO: 43 % (ref 43–75)
NRBC BLD AUTO-RTO: 0 /100 WBCS
PLATELET # BLD AUTO: 241 THOUSANDS/UL (ref 149–390)
PMV BLD AUTO: 9.1 FL (ref 8.9–12.7)
POTASSIUM SERPL-SCNC: 3.8 MMOL/L (ref 3.5–5.5)
PROTHROMBIN TIME: 10.4 SECONDS (ref 10.1–12.9)
RBC # BLD AUTO: 4.29 MILLION/UL (ref 3.81–5.12)
SODIUM SERPL-SCNC: 138 MMOL/L (ref 134–143)
TROPONIN I SERPL-MCNC: <0.03 NG/ML
TROPONIN I SERPL-MCNC: <0.03 NG/ML
WBC # BLD AUTO: 6.7 THOUSAND/UL (ref 4.31–10.16)

## 2018-09-30 PROCEDURE — 85025 COMPLETE CBC W/AUTO DIFF WBC: CPT | Performed by: EMERGENCY MEDICINE

## 2018-09-30 PROCEDURE — C9113 INJ PANTOPRAZOLE SODIUM, VIA: HCPCS | Performed by: EMERGENCY MEDICINE

## 2018-09-30 PROCEDURE — 36415 COLL VENOUS BLD VENIPUNCTURE: CPT | Performed by: EMERGENCY MEDICINE

## 2018-09-30 PROCEDURE — 99285 EMERGENCY DEPT VISIT HI MDM: CPT

## 2018-09-30 PROCEDURE — 85610 PROTHROMBIN TIME: CPT | Performed by: EMERGENCY MEDICINE

## 2018-09-30 PROCEDURE — 85730 THROMBOPLASTIN TIME PARTIAL: CPT | Performed by: EMERGENCY MEDICINE

## 2018-09-30 PROCEDURE — 71045 X-RAY EXAM CHEST 1 VIEW: CPT

## 2018-09-30 PROCEDURE — 96374 THER/PROPH/DIAG INJ IV PUSH: CPT

## 2018-09-30 PROCEDURE — 80048 BASIC METABOLIC PNL TOTAL CA: CPT | Performed by: EMERGENCY MEDICINE

## 2018-09-30 PROCEDURE — 84484 ASSAY OF TROPONIN QUANT: CPT | Performed by: EMERGENCY MEDICINE

## 2018-09-30 RX ORDER — PHENOL 1.4 %
1200 AEROSOL, SPRAY (ML) MUCOUS MEMBRANE DAILY
COMMUNITY
End: 2020-01-08

## 2018-09-30 RX ORDER — PANTOPRAZOLE SODIUM 40 MG/1
40 INJECTION, POWDER, FOR SOLUTION INTRAVENOUS ONCE
Status: COMPLETED | OUTPATIENT
Start: 2018-09-30 | End: 2018-09-30

## 2018-09-30 RX ORDER — PANTOPRAZOLE SODIUM 20 MG/1
20 TABLET, DELAYED RELEASE ORAL DAILY
Qty: 10 TABLET | Refills: 0 | Status: SHIPPED | OUTPATIENT
Start: 2018-09-30 | End: 2019-06-19 | Stop reason: ALTCHOICE

## 2018-09-30 RX ORDER — ASPIRIN 81 MG/1
81 TABLET, CHEWABLE ORAL ONCE
Status: COMPLETED | OUTPATIENT
Start: 2018-09-30 | End: 2018-09-30

## 2018-09-30 RX ADMIN — PANTOPRAZOLE SODIUM 40 MG: 40 INJECTION, POWDER, FOR SOLUTION INTRAVENOUS at 02:11

## 2018-09-30 RX ADMIN — ASPIRIN 81 MG 81 MG: 81 TABLET ORAL at 02:10

## 2018-09-30 NOTE — ED PROVIDER NOTES
History  Chief Complaint   Patient presents with    Chest Pain     Patient states that she has had intermittent chest pressure since yesterday with no trauma or injury noted  the pressure in midsternal area woke her out of a sleep with no radiation of the pressure  Denies SOB orcough/cold symptoms  Does admit to a hx of GERD  This is a 70-year-old female comes emergency room with complaint of chest pain  It is anterior central chest that started several hours after she went to sleep  It is a pressure-like sensation  She has had intermittent episodes since yesterday  She does remember having had similar symptoms with a hospitalization at [facility] in the past with note of negative troponins and a diagnosis of reflux disease  She is currently taking no medication for this  She denies shortness of breath diaphoresis  Prior to Admission Medications   Prescriptions Last Dose Informant Patient Reported? Taking?    ALENDRONATE SODIUM PO 9/29/2018 Self Yes No   Sig: Take 70 mg by mouth weekly    Biotin 10 MG TABS 9/29/2018 at 0800 Self Yes Yes   Sig: Take by mouth   Cholecalciferol (VITAMIN D) 2000 units CAPS 9/29/2018 at 0800 Self Yes Yes   Sig: Take by mouth   Ginger, Zingiber officinalis, (GINGER PO) 9/29/2018 at 0800 Self Yes Yes   Sig: Take 500 mg by mouth daily   MAGNESIUM PO 9/29/2018 at 0800 Self Yes Yes   Sig: Take 250 mg by mouth daily   Omega-3 Fatty Acids ([employer] NATURAL FISH OIL) 1000 MG CAPS 9/29/2018 at 0800 Self Yes Yes   Sig: Take 2 capsules by mouth daily   Red Yeast Rice Extract (RED YEAST RICE PO) 9/29/2018 at 0800 Self Yes Yes   Sig: Take 2,400 mg by mouth daily   TURMERIC PO 9/29/2018 at 0800 Self Yes Yes   Sig: Take 538 mg by mouth daily   acetaminophen (TYLENOL) 325 mg tablet Unknown at Unknown time Self No No   Sig: Take 2 tablets by mouth every 6 (six) hours as needed for headaches   amLODIPine (NORVASC) 5 mg tablet 9/29/2018 at 0800 Self Yes Yes   Sig: Take 5 mg by mouth daily   aspirin (ECOTRIN LOW STRENGTH) 81 mg EC tablet 9/29/2018 at 0800 Self Yes Yes   Sig: Take 81 mg by mouth daily   atenolol (TENORMIN) 25 mg tablet 9/29/2018 at 0800 Self Yes Yes   Sig: Take 1 tablet by mouth daily   calcium carbonate (OS-SUSAN) 600 MG tablet 9/29/2018 at 0800  Yes Yes   Sig: Take 1,200 mg by mouth daily   ezetimibe (ZETIA) 10 mg tablet Unknown at Unknown time Self Yes No   Sig: Take 10 mg by mouth daily   psyllium (METAMUCIL) 58 6 % packet 9/29/2018 at 0800 Self Yes Yes   Sig: Take 1 packet by mouth daily      Facility-Administered Medications: None       Past Medical History:   Diagnosis Date    Arthritis     Back pain     [facility] [address] [facility]     [facility] kidney [facility] [address] [facility]     Claustrophobia     Diverticula of colon     Ductal carcinoma in situ of breast     Hyperlipidemia     Hypertension     Kidney stone     Myocardial ischemia     Renal mass     Right bundle branch block     Stress incontinence        Past Surgical History:   Procedure Laterality Date    BREAST SURGERY      left  breast lumpectomy-benign    GANGLION CYST EXCISION      HYSTERECTOMY      KIDNEY SURGERY      left kidney removal    NEPHRECTOMY Left     cancer    PLANTAR FASCIECTOMY Left     REPLACEMENT TOTAL KNEE Bilateral     SKIN CANCER EXCISION      melanoma removed left forearm and right lower back       History reviewed  No pertinent family history  I have reviewed and agree with the history as documented  Social History   Substance Use Topics    Smoking status: Never Smoker    Smokeless tobacco: Never Used    Alcohol use No        Review of Systems   Constitutional: Negative  Negative for fever  HENT: Negative  Eyes: Negative  Respiratory: Negative  Negative for cough and shortness of breath  Cardiovascular: Positive for chest pain  Negative for leg swelling  Gastrointestinal: Negative  Endocrine: Negative  Genitourinary: Negative  Musculoskeletal: Negative      Skin: Negative  Negative for rash  Neurological: Negative  Psychiatric/Behavioral: Negative  Physical Exam  Physical Exam   Constitutional: She appears well-developed and well-nourished  HENT:   Head: Normocephalic and atraumatic  Eyes: Pupils are equal, round, and reactive to light  Conjunctivae and EOM are normal    Neck: Normal range of motion  Neck supple  Cardiovascular: Normal rate, regular rhythm and normal heart sounds  Exam reveals no gallop and no friction rub  No murmur heard  Pulmonary/Chest: Effort normal and breath sounds normal    Abdominal: Soft  Bowel sounds are normal    Musculoskeletal: Normal range of motion  Neurological: She is alert  Skin: Skin is warm and dry  Psychiatric: She has a normal mood and affect         Vital Signs  ED Triage Vitals [09/30/18 0145]   Temperature Pulse Respirations Blood Pressure SpO2   98 1 °F (36 7 °C) 70 18 (!) 181/77 96 %      Temp Source Heart Rate Source Patient Position - Orthostatic VS BP Location FiO2 (%)   Tympanic Monitor Lying Left arm --      Pain Score       6           Vitals:    09/30/18 0415 09/30/18 0504 09/30/18 0541 09/30/18 0547   BP: 144/59 158/63 147/65 147/65   Pulse: (!) 53 59 58 58   Patient Position - Orthostatic VS: Lying Lying         Visual Acuity      ED Medications  Medications   aspirin chewable tablet 81 mg (81 mg Oral Given 9/30/18 0210)   pantoprazole (PROTONIX) injection 40 mg (40 mg Intravenous Given 9/30/18 0211)       Diagnostic Studies  Results Reviewed     Procedure Component Value Units Date/Time    Troponin I [[id number]]  (Normal) Collected:  09/30/18 0501    Lab Status:  Final result Specimen:  Blood from Arm, Left Updated:  09/30/18 0537     Troponin I <0 03 ng/mL     Troponin I [[id number]]  (Normal) Collected:  09/30/18 0202    Lab Status:  Final result Specimen:  Blood from Arm, Left Updated:  09/30/18 0253     Troponin I <0 03 ng/mL     Basic metabolic panel [[id number]]  (Abnormal) Collected: 09/30/18 0202    Lab Status:  Final result Specimen:  Blood from Arm, Left Updated:  09/30/18 0237     Sodium 138 mmol/L      Potassium 3 8 mmol/L      Chloride 103 mmol/L      CO2 25 mmol/L      ANION GAP 10 mmol/L      BUN 29 (H) mg/dL      Creatinine 1 32 (H) mg/dL      Glucose 110 mg/dL      Calcium 10 3 mg/dL      eGFR 40 ml/min/1 73sq m     Narrative:         National Kidney Disease Education Program recommendations are as follows:  GFR calculation is accurate only with a steady state creatinine  Chronic Kidney disease less than 60 ml/min/1 73 sq  meters  Kidney failure less than 15 ml/min/1 73 sq  meters  Protime-INR [[id number]]  (Normal) Collected:  09/30/18 0202    Lab Status:  Final result Specimen:  Blood from Arm, Left Updated:  09/30/18 0225     Protime 10 4 seconds      INR 0 91    APTT [[id number]]  (Normal) Collected:  09/30/18 0202    Lab Status:  Final result Specimen:  Blood from Arm, Left Updated:  09/30/18 0225     PTT 26 seconds     CBC and differential [[id number]]  (Abnormal) Collected:  09/30/18 0202    Lab Status:  Final result Specimen:  Blood from Arm, Left Updated:  09/30/18 0213     WBC 6 70 Thousand/uL      RBC 4 29 Million/uL      Hemoglobin 13 5 g/dL      Hematocrit 40 8 %      MCV 95 fL      MCH 31 5 pg      MCHC 33 1 g/dL      RDW 14 5 %      MPV 9 1 fL      Platelets 561 Thousands/uL      nRBC 0 /100 WBCs      Neutrophils Relative 43 %      Lymphocytes Relative 45 (H) %      Monocytes Relative 8 %      Eosinophils Relative 2 %      Basophils Relative 1 %      Neutrophils Absolute 2 90 Thousands/µL      Lymphocytes Absolute 3 00 Thousands/µL      Monocytes Absolute 0 60 Thousand/µL      Eosinophils Absolute 0 10 Thousand/µL      Basophils Absolute 0 10 Thousands/µL                  XR chest 1 view portable   Final Result by Interface, Radiology Results In (09/30 3829)   No acute cardiopulmonary disease             Signed by Gail Albaraod                 Procedures  Procedures Phone Contacts  ED Phone Contact    ED Course  ED Course as of Sep 30 9023   Petra Due Sep 30, 2018   9882 I have reviewed labs, awaiting the second troponin  BUN/CR at baseline  0426 Pt remains pain free after meds  Awaiting second troponin  0543 Second Troponin NL  Will discharge to follow up   MDM  CritCare Time    Disposition  Final diagnoses:   GERD (gastroesophageal reflux disease)     Time reflects when diagnosis was documented in both MDM as applicable and the Disposition within this note     Time User Action Codes Description Comment    9/30/2018  5:43 AM Jamaal Becker Add [K21 9] GERD (gastroesophageal reflux disease)       ED Disposition     ED Disposition Condition Comment    Discharge  [address] discharge to home/self care  Condition at discharge: Good        Follow-up Information     Follow up With Specialties Details Why [address] [place] [address], DO Family Medicine Schedule an appointment as soon as possible for a visit For follow up of your current symptoms  [address] [place] [address]            Patient's Medications   Discharge Prescriptions    PANTOPRAZOLE (PROTONIX) 20 MG TABLET    Take 1 tablet (20 mg total) by mouth daily for 10 days       Start Date: 9/30/2018 End Date: 10/10/2018       Order Dose: 20 mg       Quantity: 10 tablet    Refills: 0     No discharge procedures on file      ED Provider  Electronically Signed by           Kathi Ventura MD  09/30/18 0806

## 2018-09-30 NOTE — ED PROCEDURE NOTE
PROCEDURE  ECG 12 Lead Documentation  Date/Time: 9/30/2018 1:59 AM  Performed by: Ann Jack  Authorized by: Ann Jack     Indications / Diagnosis:  Chest pain  ECG reviewed by me, the ED Provider: yes    Patient location:  ED  Previous ECG:     Previous ECG:  Unavailable    Comparison to cardiac monitor: Yes    Interpretation:     Interpretation: non-specific    Rate:     ECG rate:  70    ECG rate assessment: normal    Rhythm:     Rhythm: sinus rhythm    Ectopy:     Ectopy: none    QRS:     QRS axis:  Normal    QRS intervals: Wide  Conduction:     Conduction: abnormal      Abnormal conduction: complete RBBB    ST segments:     ST segments:  Normal  T waves:     T waves: normal    Q waves:     Q waves:  II  Comments:      Pt has a known RBBB by old records            Laura Moore MD  09/30/18 0203

## 2018-09-30 NOTE — DISCHARGE INSTRUCTIONS
Gastroesophageal Reflux Disease   WHAT YOU NEED TO KNOW:   Gastroesophageal reflux occurs when acid and food in the stomach back up into the esophagus  Gastroesophageal reflux disease (GERD) is reflux that occurs more than twice a week for a few weeks  It usually causes heartburn and other symptoms  GERD can cause other health problems over time if it is not treated  DISCHARGE INSTRUCTIONS:   Return to the emergency department if:   · You feel full and cannot burp or vomit  · You have severe chest pain and sudden trouble breathing  · Your bowel movements are black, bloody, or tarry-looking  · Your vomit looks like coffee grounds or has blood in it  Contact your healthcare provider if:   · You vomit large amounts, or you vomit often  · You have trouble breathing after you vomit  · You have trouble swallowing, or pain with swallowing  · You are losing weight without trying  · Your symptoms get worse or do not improve with treatment  · You have questions or concerns about your condition or care  Medicines:   · Medicines  are used to decrease stomach acid  Medicine may also be used to help your lower esophageal sphincter and stomach contract (tighten) more  · Take your medicine as directed  Contact your healthcare provider if you think your medicine is not helping or if you have side effects  Tell him of her if you are allergic to any medicine  Keep a list of the medicines, vitamins, and herbs you take  Include the amounts, and when and why you take them  Bring the list or the pill bottles to follow-up visits  Carry your medicine list with you in case of an emergency  Manage GERD:   · Do not have foods or drinks that may increase heartburn  These include chocolate, peppermint, fried or fatty foods, drinks that contain caffeine, or carbonated drinks (soda)  Other foods include spicy foods, onions, tomatoes, and tomato-based foods   Do not have foods or drinks that can irritate your esophagus, such as citrus fruits, juices, and alcohol  · Do not eat large meals  When you eat a lot of food at one time, your stomach needs more acid to digest it  Eat 6 small meals each day instead of 3 large ones, and eat slowly  Do not eat meals 2 to 3 hours before bedtime  · Elevate the head of your bed  Place 6-inch blocks under the head of your bed frame  You may also use more than one pillow under your head and shoulders while you sleep  · Maintain a healthy weight  If you are overweight, weight loss may help relieve symptoms of GERD  · Do not smoke  Smoking weakens the lower esophageal sphincter and increases the risk of GERD  Ask your healthcare provider for information if you currently smoke and need help to quit  E-cigarettes or smokeless tobacco still contain nicotine  Talk to your healthcare provider before you use these products  · Do not wear clothing that is tight around your waist   Tight clothing can put pressure on your stomach and cause or worsen GERD symptoms  Follow up with your healthcare provider as directed:  Write down your questions so you remember to ask them during your visits  © 2017 [facility] Information is for End User's use only and may not be sold, redistributed or otherwise used for commercial purposes  All illustrations and images included in CareNotes® are the copyrighted property of [employer]  or [employer]  The above information is an  only  It is not intended as medical advice for individual conditions or treatments  Talk to your doctor, nurse or pharmacist before following any medical regimen to see if it is safe and effective for you

## 2018-09-30 NOTE — ED NOTES
Patient ready to be discharged - removed from monitor - no further chest pressure - 2nd cardiac enzyme negative     Zana Mitchell RN  09/30/18 8996

## 2018-12-06 ENCOUNTER — LAB (OUTPATIENT)
Dept: LAB | Facility: HOSPITAL | Age: 73
End: 2018-12-06
Payer: MEDICARE

## 2018-12-06 ENCOUNTER — TRANSCRIBE ORDERS (OUTPATIENT)
Dept: ADMINISTRATIVE | Facility: HOSPITAL | Age: 73
End: 2018-12-06

## 2018-12-06 DIAGNOSIS — E78.5 HYPERLIPIDEMIA, UNSPECIFIED HYPERLIPIDEMIA TYPE: Primary | ICD-10-CM

## 2018-12-06 DIAGNOSIS — E78.5 HYPERLIPIDEMIA, UNSPECIFIED HYPERLIPIDEMIA TYPE: ICD-10-CM

## 2018-12-06 LAB
ALBUMIN SERPL BCP-MCNC: 4.1 G/DL (ref 3.5–5.7)
ALP SERPL-CCNC: 35 U/L (ref 55–165)
ALT SERPL W P-5'-P-CCNC: 14 U/L (ref 7–52)
ANION GAP SERPL CALCULATED.3IONS-SCNC: 5 MMOL/L (ref 4–13)
AST SERPL W P-5'-P-CCNC: 14 U/L (ref 13–39)
BILIRUB SERPL-MCNC: 0.5 MG/DL (ref 0.2–1)
BUN SERPL-MCNC: 23 MG/DL (ref 7–25)
CALCIUM SERPL-MCNC: 9.5 MG/DL (ref 8.6–10.5)
CHLORIDE SERPL-SCNC: 109 MMOL/L (ref 98–107)
CHOLEST SERPL-MCNC: 227 MG/DL (ref 0–200)
CO2 SERPL-SCNC: 27 MMOL/L (ref 21–31)
CREAT SERPL-MCNC: 1.18 MG/DL (ref 0.6–1.2)
GFR SERPL CREATININE-BSD FRML MDRD: 46 ML/MIN/1.73SQ M
GLUCOSE P FAST SERPL-MCNC: 117 MG/DL (ref 65–99)
HDLC SERPL-MCNC: 43 MG/DL (ref 40–60)
LDLC SERPL CALC-MCNC: 159 MG/DL (ref 0–100)
NONHDLC SERPL-MCNC: 184 MG/DL
POTASSIUM SERPL-SCNC: 4.5 MMOL/L (ref 3.5–5.5)
PROT SERPL-MCNC: 6.1 G/DL (ref 6.4–8.9)
SODIUM SERPL-SCNC: 141 MMOL/L (ref 134–143)
TRIGL SERPL-MCNC: 126 MG/DL (ref 44–166)

## 2018-12-06 PROCEDURE — 80053 COMPREHEN METABOLIC PANEL: CPT

## 2018-12-06 PROCEDURE — 80061 LIPID PANEL: CPT

## 2018-12-06 PROCEDURE — 36415 COLL VENOUS BLD VENIPUNCTURE: CPT

## 2018-12-11 RX ORDER — POLYMYXIN B SULFATE AND TRIMETHOPRIM 1; 10000 MG/ML; [USP'U]/ML
SOLUTION OPHTHALMIC
COMMUNITY
End: 2018-12-14 | Stop reason: ALTCHOICE

## 2018-12-11 RX ORDER — PREDNISOLONE ACETATE 10 MG/ML
SUSPENSION/ DROPS OPHTHALMIC
COMMUNITY
End: 2018-12-14 | Stop reason: ALTCHOICE

## 2018-12-14 ENCOUNTER — OFFICE VISIT (OUTPATIENT)
Dept: UROLOGY | Facility: MEDICAL CENTER | Age: 73
End: 2018-12-14
Payer: MEDICARE

## 2018-12-14 VITALS
WEIGHT: 186 LBS | SYSTOLIC BLOOD PRESSURE: 140 MMHG | HEART RATE: 69 BPM | HEIGHT: 66 IN | DIASTOLIC BLOOD PRESSURE: 70 MMHG | BODY MASS INDEX: 29.89 KG/M2

## 2018-12-14 DIAGNOSIS — Z85.528 HISTORY OF KIDNEY CANCER: Primary | ICD-10-CM

## 2018-12-14 DIAGNOSIS — Z90.5 HISTORY OF NEPHRECTOMY: ICD-10-CM

## 2018-12-14 LAB
SL AMB  POCT GLUCOSE, UA: NORMAL
SL AMB LEUKOCYTE ESTERASE,UA: NORMAL
SL AMB POCT BILIRUBIN,UA: NORMAL
SL AMB POCT BLOOD,UA: NORMAL
SL AMB POCT CLARITY,UA: CLEAR
SL AMB POCT COLOR,UA: YELLOW
SL AMB POCT KETONES,UA: NORMAL
SL AMB POCT NITRITE,UA: NORMAL
SL AMB POCT PH,UA: 5
SL AMB POCT SPECIFIC GRAVITY,UA: 1.01
SL AMB POCT URINE PROTEIN: NORMAL
SL AMB POCT UROBILINOGEN: 0.2

## 2018-12-14 PROCEDURE — 81003 URINALYSIS AUTO W/O SCOPE: CPT | Performed by: UROLOGY

## 2018-12-14 PROCEDURE — 99214 OFFICE O/P EST MOD 30 MIN: CPT | Performed by: UROLOGY

## 2018-12-14 NOTE — LETTER
December 14, 2018     Daniela Luciano, [facility]    Patient: Veronique Powell   YOB: 1945   Date of Visit: 12/14/2018       Dear Dr Marie Henry Recipients: Thank you for referring Joy Booth to me for evaluation  Below are my notes for this consultation  If you have questions, please do not hesitate to call me  I look forward to following your patient along with you  Sincerely,        Pamella Novak MD        CC: No Recipients  Pamella Novak MD  12/14/2018  8:57 AM  Sign at close encounter  Assessment/Plan:      Diagnoses and all orders for this visit:    History of kidney cancer  -     POCT urine dip auto non-scope  -     US kidney and bladder; Future    History of nephrectomy  -     US kidney and bladder; Future    Other orders  -     Discontinue: polymyxin b-trimethoprim (POLYTRIM) ophthalmic solution; polymyxin B sulfate 10,000 unit-trimethoprim 1 mg/mL eye drops  -     Discontinue: prednisoLONE acetate (PRED FORTE) 1 % ophthalmic suspension; prednisolone acetate 1 % eye drops,suspension        History of nephrolithiasis        Subjective:  No complaints     Patient ID: Veronique Powell is a 68 y o  female  HPI  She is 2 years after her laparoscopic robotic left radical nephrectomy for pT1a RCC  No problems or complaints to report  She is not having any flank pain and has a good appetite with bowels moving normally  She is not having pain and any new locations and has not had any unwanted weight loss  Her last radiologic imaging studies were approximately 12 months ago  She has a history of nephrolithiasis on the right side, and she has had no interval complaints concerning that history  She denies any flank pain, or hematuria  Review of Systems   Constitutional: Negative  HENT: Negative  Eyes: Positive for visual disturbance  Respiratory: Negative  Cardiovascular: Negative  Gastrointestinal: Negative  Endocrine: Negative  Genitourinary: Negative  Musculoskeletal: Negative  Skin: Negative  Allergic/Immunologic: Negative  Neurological: Negative  Hematological: Negative  Psychiatric/Behavioral: Negative  Objective:     Physical Exam   Constitutional: She is oriented to person, place, and time  She appears well-developed and well-nourished  No distress  HENT:   Head: Normocephalic and atraumatic  Nose: Nose normal    Mouth/Throat: Oropharynx is clear and moist    Eyes: Pupils are equal, round, and reactive to light  Conjunctivae and EOM are normal  No scleral icterus  Neck: Normal range of motion  Neck supple  Cardiovascular: Normal rate, regular rhythm, normal heart sounds and intact distal pulses  No murmur heard  Pulmonary/Chest: Effort normal and breath sounds normal  No respiratory distress  She has no wheezes  She has no rales  Abdominal: Soft  Bowel sounds are normal  She exhibits no distension and no mass  There is no tenderness  There is no CVA tenderness  Hernia confirmed negative in the ventral area  Musculoskeletal: Normal range of motion  She exhibits no edema or tenderness  Lymphadenopathy:     She has no cervical adenopathy  Neurological: She is alert and oriented to person, place, and time  No cranial nerve deficit  Skin: Skin is warm and dry  No rash noted  No erythema  No pallor  Psychiatric: She has a normal mood and affect  Her behavior is normal  Judgment and thought content normal    Nursing note and vitals reviewed

## 2018-12-14 NOTE — PROGRESS NOTES
Assessment/Plan:      Diagnoses and all orders for this visit:    History of kidney cancer  -     POCT urine dip auto non-scope  -     US kidney and bladder; Future    History of nephrectomy  -     US kidney and bladder; Future    Other orders  -     Discontinue: polymyxin b-trimethoprim (POLYTRIM) ophthalmic solution; polymyxin B sulfate 10,000 unit-trimethoprim 1 mg/mL eye drops  -     Discontinue: prednisoLONE acetate (PRED FORTE) 1 % ophthalmic suspension; prednisolone acetate 1 % eye drops,suspension        History of nephrolithiasis        Subjective:  No complaints     Patient ID: Elo Sotomayor is a 68 y o  female  HPI  She is 2 years after her laparoscopic robotic left radical nephrectomy for pT1a RCC  No problems or complaints to report  She is not having any flank pain and has a good appetite with bowels moving normally  She is not having pain and any new locations and has not had any unwanted weight loss  Her last radiologic imaging studies were approximately 12 months ago  She has a history of nephrolithiasis on the right side, and she has had no interval complaints concerning that history  She denies any flank pain, or hematuria  Review of Systems   Constitutional: Negative  HENT: Negative  Eyes: Positive for visual disturbance  Respiratory: Negative  Cardiovascular: Negative  Gastrointestinal: Negative  Endocrine: Negative  Genitourinary: Negative  Musculoskeletal: Negative  Skin: Negative  Allergic/Immunologic: Negative  Neurological: Negative  Hematological: Negative  Psychiatric/Behavioral: Negative  Objective:     Physical Exam   Constitutional: She is oriented to person, place, and time  She appears well-developed and well-nourished  No distress  HENT:   Head: Normocephalic and atraumatic  Nose: Nose normal    Mouth/Throat: Oropharynx is clear and moist    Eyes: Pupils are equal, round, and reactive to light   Conjunctivae and EOM are normal  No scleral icterus  Neck: Normal range of motion  Neck supple  Cardiovascular: Normal rate, regular rhythm, normal heart sounds and intact distal pulses  No murmur heard  Pulmonary/Chest: Effort normal and breath sounds normal  No respiratory distress  She has no wheezes  She has no rales  Abdominal: Soft  Bowel sounds are normal  She exhibits no distension and no mass  There is no tenderness  There is no CVA tenderness  Hernia confirmed negative in the ventral area  Musculoskeletal: Normal range of motion  She exhibits no edema or tenderness  Lymphadenopathy:     She has no cervical adenopathy  Neurological: She is alert and oriented to person, place, and time  No cranial nerve deficit  Skin: Skin is warm and dry  No rash noted  No erythema  No pallor  Psychiatric: She has a normal mood and affect  Her behavior is normal  Judgment and thought content normal    Nursing note and vitals reviewed

## 2018-12-16 ENCOUNTER — APPOINTMENT (EMERGENCY)
Dept: RADIOLOGY | Facility: HOSPITAL | Age: 73
End: 2018-12-16
Payer: MEDICARE

## 2018-12-16 ENCOUNTER — HOSPITAL ENCOUNTER (EMERGENCY)
Facility: HOSPITAL | Age: 73
Discharge: HOME | End: 2018-12-16
Payer: MEDICARE

## 2018-12-16 VITALS
SYSTOLIC BLOOD PRESSURE: 194 MMHG | TEMPERATURE: 97.2 F | DIASTOLIC BLOOD PRESSURE: 81 MMHG | WEIGHT: 180 LBS | HEIGHT: 66 IN | HEART RATE: 60 BPM | BODY MASS INDEX: 28.93 KG/M2 | OXYGEN SATURATION: 100 % | RESPIRATION RATE: 18 BRPM

## 2018-12-16 DIAGNOSIS — M79.672 LEFT FOOT PAIN: Primary | ICD-10-CM

## 2018-12-16 PROCEDURE — 73630 X-RAY EXAM OF FOOT: CPT

## 2018-12-16 PROCEDURE — 99283 EMERGENCY DEPT VISIT LOW MDM: CPT

## 2018-12-16 RX ORDER — TRAMADOL HYDROCHLORIDE 50 MG/1
50 TABLET ORAL ONCE
Status: COMPLETED | OUTPATIENT
Start: 2018-12-16 | End: 2018-12-16

## 2018-12-16 RX ADMIN — TRAMADOL HYDROCHLORIDE 50 MG: 50 TABLET, COATED ORAL at 14:57

## 2018-12-16 NOTE — ED PROVIDER NOTES
History  Chief Complaint   Patient presents with    Foot Pain     Pt states that she has pain on the top of her left foot; no known injury     Anabel Johnston is a 72-year-old female came to the emergency department due to pain on the dorsum of the left foot with started several days prior to arrival   Patient denies injury to the involved extremity  Patient describes the pain as throbbing and aching  History provided by:  Patient and spouse   used: No    Leg Pain   Location:  Foot  Time since incident: Several   Injury: no    Foot location:  L foot  Pain details:     Quality:  Aching and throbbing    Radiates to:  Does not radiate    Severity:  Moderate    Onset quality:  Gradual    Duration: Several     Timing:  Constant    Progression:  Worsening  Chronicity:  New  Dislocation: yes    Foreign body present:  No foreign bodies  Tetanus status:  Unknown  Prior injury to area:  No  Relieved by:  Nothing  Worsened by:  Nothing  Ineffective treatments:  Acetaminophen  Associated symptoms: no back pain, no decreased ROM, no fatigue, no fever, no itching, no muscle weakness, no neck pain, no numbness, no stiffness, no swelling and no tingling        Prior to Admission Medications   Prescriptions Last Dose Informant Patient Reported? Taking?    ALENDRONATE SODIUM PO  Self Yes Yes   Sig: Take 70 mg by mouth weekly    Biotin 10 MG TABS  Self Yes Yes   Sig: Take by mouth   Cholecalciferol (VITAMIN D) 2000 units CAPS  Self Yes Yes   Sig: Take by mouth   Nedra, Zingiber officinalis, (NEDRA PO)  Self Yes Yes   Sig: Take 500 mg by mouth daily   MAGNESIUM PO  Self Yes Yes   Sig: Take 250 mg by mouth daily   Omega-3 Fatty Acids ([employer] NATURAL FISH OIL) 1000 MG CAPS  Self Yes Yes   Sig: Take 2 capsules by mouth daily   Red Yeast Rice Extract (RED YEAST RICE PO)  Self Yes Yes   Sig: Take 2,400 mg by mouth daily   TURMERIC PO  Self Yes Yes   Sig: Take 538 mg by mouth daily   acetaminophen (TYLENOL) 325 mg tablet  Self No No   Sig: Take 2 tablets by mouth every 6 (six) hours as needed for headaches   amLODIPine (NORVASC) 5 mg tablet  Self Yes Yes   Sig: Take 5 mg by mouth daily   aspirin (ECOTRIN LOW STRENGTH) 81 mg EC tablet  Self Yes Yes   Sig: Take 81 mg by mouth daily   atenolol (TENORMIN) 25 mg tablet  Self Yes Yes   Sig: Take 1 tablet by mouth daily   calcium carbonate (OS-SUSAN) 600 MG tablet   Yes Yes   Sig: Take 1,200 mg by mouth daily   ezetimibe (ZETIA) 10 mg tablet  Self Yes Yes   Sig: Take 10 mg by mouth daily   pantoprazole (PROTONIX) 20 mg tablet   No No   Sig: Take 1 tablet (20 mg total) by mouth daily for 10 days   psyllium (METAMUCIL) 58 6 % packet  Self Yes Yes   Sig: Take 1 packet by mouth daily      Facility-Administered Medications: None       Past Medical History:   Diagnosis Date    Arthritis     Back pain     [facility] [address] [facility]     Cancer of kidney ([facility] [address] [facility]     Claustrophobia     Diverticula of colon     Ductal carcinoma in situ of breast     Hyperlipidemia     Hypertension     Kidney stone     Myocardial ischemia     Renal mass     Right bundle branch block     Stress incontinence        Past Surgical History:   Procedure Laterality Date    BREAST SURGERY      left  breast lumpectomy-benign    GANGLION CYST EXCISION      HYSTERECTOMY      KIDNEY SURGERY      left kidney removal    NEPHRECTOMY Left     cancer    PLANTAR FASCIECTOMY Left     REPLACEMENT TOTAL KNEE Bilateral     SKIN CANCER EXCISION      melanoma removed left forearm and right lower back       History reviewed  No pertinent family history  I have reviewed and agree with the history as documented  Social History   Substance Use Topics    Smoking status: Never Smoker    Smokeless tobacco: Never Used    Alcohol use No        Review of Systems   Constitutional: Negative for fatigue and fever  HENT: Negative  Eyes: Negative  Respiratory: Negative  Cardiovascular: Negative      Gastrointestinal: Negative  Endocrine: Negative  Genitourinary: Negative  Musculoskeletal: Negative for back pain, neck pain and stiffness  Skin: Negative  Negative for itching  Allergic/Immunologic: Negative  Neurological: Negative  Hematological: Negative  Psychiatric/Behavioral: Negative  Physical Exam  Physical Exam   Constitutional: She is oriented to person, place, and time  She appears well-developed and well-nourished  No distress  HENT:   Head: Normocephalic and atraumatic  Right Ear: External ear normal    Left Ear: External ear normal    Nose: Nose normal    Mouth/Throat: Oropharynx is clear and moist  No oropharyngeal exudate  Eyes: Pupils are equal, round, and reactive to light  Conjunctivae and EOM are normal  Right eye exhibits no discharge  Left eye exhibits no discharge  No scleral icterus  Neck: Normal range of motion  Neck supple  No tracheal deviation present  No thyromegaly present  Cardiovascular: Normal rate, regular rhythm, normal heart sounds and intact distal pulses  Pulmonary/Chest: Effort normal and breath sounds normal    Abdominal: Soft  Bowel sounds are normal  She exhibits no distension  There is no tenderness  Musculoskeletal: Normal range of motion  She exhibits no edema, tenderness or deformity  Mild tenderness noted on the dorsum of the left foot   Lymphadenopathy:     She has no cervical adenopathy  Neurological: She is alert and oriented to person, place, and time  No cranial nerve deficit or sensory deficit  She exhibits normal muscle tone  Coordination normal    Skin: Skin is warm and dry  No rash noted  She is not diaphoretic  No erythema  No pallor  Psychiatric: She has a normal mood and affect  Her behavior is normal  Judgment and thought content normal    Nursing note and vitals reviewed        Vital Signs  ED Triage Vitals [12/16/18 1450]   Temperature Pulse Respirations Blood Pressure SpO2   (!) 97 2 °F (36 2 °C) 60 18 (!) 194/81 100 % Temp Source Heart Rate Source Patient Position - Orthostatic VS BP Location FiO2 (%)   Tympanic Monitor Sitting Right arm --      Pain Score       8           Vitals:    12/16/18 1450   BP: (!) 194/81   Pulse: 60   Patient Position - Orthostatic VS: Sitting       Visual Acuity      ED Medications  Medications   traMADol (ULTRAM) tablet 50 mg (50 mg Oral Given 12/16/18 1457)       Diagnostic Studies  Results Reviewed     None                 XR foot 3+ views LEFT   ED Interpretation by Stephani Saha MD (12/16 1530)   No fracture noted  Final Result by Aquiles Mason (12/16 1551)   Degenerative and chronic findings  Large heel spur  Signed by Terral Essex, MD                 Procedures  Procedures       Phone Contacts  ED Phone Contact    ED Course  ED Course as of Dec 16 1603   Sun Dec 16, 2018   1558 X-ray results were discussed with the patient with  at the bedside  Patient is resting comfortably on the stretcher     1601 I discussed with her the incidental findings bone spurs and degenerative joint disease on the foot x-ray                                  MDM  Number of Diagnoses or Management Options  Left foot pain: minor     Amount and/or Complexity of Data Reviewed  Tests in the radiology section of CPT®: ordered and reviewed  Tests in the medicine section of CPT®: ordered and reviewed  Independent visualization of images, tracings, or specimens: yes    Risk of Complications, Morbidity, and/or Mortality  Presenting problems: minimal  Diagnostic procedures: minimal  Management options: minimal    Patient Progress  Patient progress: stable    CritCare Time    Disposition  Final diagnoses:   Left foot pain     Time reflects when diagnosis was documented in both MDM as applicable and the Disposition within this note     Time User Action Codes Description Comment    12/16/2018  4:02 PM Kathy Munson Add [Z34 [id number]] Left foot pain       ED Disposition     ED Disposition Condition Comment Discharge  [address] discharge to home/self care  Condition at discharge: Good        Follow-up Information     Follow up With Specialties Details Why [address], [address] In 3 days  [address] [place] [address]  [phone]            Patient's Medications   Discharge Prescriptions    No medications on file     No discharge procedures on file      ED Provider  Electronically Signed by           Tania Dotson MD  12/16/18 4051

## 2018-12-16 NOTE — DISCHARGE INSTRUCTIONS
Metatarsalgia   WHAT YOU NEED TO KNOW:   What is metatarsalgia? Metatarsalgia is pain in the ball of your foot, near your second, third, and fourth toes  What causes or increases my risk for metatarsalgia? · Playing weight-bearing sports, such as tennis or running    · Wearing high heels, or narrow or tight shoes    · Being overweight    · Rheumatoid arthritis, osteoarthritis, or gout    · Foot deformities such as hammer toe or Achilles tendon problems    · Trauma such as a tear in the area where you have pain, or a stress fracture in your foot  What are the signs and symptoms of metatarsalgia? Symptoms usually develop over time, but you may have sudden pain from an injury  You may have any of the following:  · Pain at the ball of your foot or near your toes that gets worse when you walk or stand, especially on hard surfaces    · Pain during exercises such as running    · Sharp or shooting pain in your toes that may get worse when you flex your toes    · Tingling or numbness in your toes    · Feeling like you are walking over rocks, or that you have a bruise on your toe    · A change in the way you walk because you try to avoid putting pressure on the ball of your foot  How is metatarsalgia diagnosed and treated? Your healthcare provider will examine your feet and legs as you stand and walk  X-ray, CT, or ultrasound pictures may show a problem with your foot, such as a fracture  You may be given contrast liquid to help foot problems show up better in the pictures  Tell the healthcare provider if you have ever had an allergic reaction to contrast liquid  The cause of your metatarsalgia will be treated, if possible  You may also need any of the following:  · NSAIDs , such as ibuprofen, help decrease swelling, pain, and fever  This medicine is available with or without a doctor's order  NSAIDs can cause stomach bleeding or kidney problems in certain people   If you take blood thinner medicine, always ask if NSAIDs are safe for you  Always read the medicine label and follow directions  Do not give these medicines to children under 10months of age without direction from your child's healthcare provider  · Ultrasound  may be used to relieve your pain  Sound waves from the ultrasound can help send heat deeper into your tissues  · A steroid injection  may help decrease inflammation  · Surgery  may be needed if other treatments do not work  Surgery may be used to align the bones near your toes  You may also need surgery to fix a problem such as hammertoe  What can I do to manage or prevent metatarsalgia? · Rest your foot  If you play sports, you may not be able to do weight-bearing exercises  Examples include swimming and bike riding  Ask your healthcare provider which exercises are safe for you  · Apply ice as directed  Ice helps reduce pain and swelling  Use an ice pack, or put crushed ice in a plastic bag  Cover the pack or bag with a towel before you apply it to your foot  Apply ice for 15 to 20 minutes every hour, or as directed  · Use a cane or crutch if directed  These devices may help take pressure off your foot while it heals  · Wear proper shoes  Do not wear shoes that are narrow or tight  You may need to wear shoes that are wider than you usually wear  Choose shoes that do not have a raised heel  Shock-absorbing shoes can help prevent injury  These shoes will have extra support under your feet and toes  You can also add shoe cushions inside your shoes or to the bottoms of your feet, near your toes  The cushions may provide more support and make walking or standing more comfortable  Arch supports may help take pressure off your toes  · Reach or maintain a healthy weight  Extra weight can put pressure on your feet  Talk to your healthcare provider about a healthy weight for you  Your provider can help you create a safe weight loss plan if you are overweight      · Go to physical therapy if directed  A physical therapist can help improve your strength and range of motion  The therapist can also help you improve the way you walk to prevent metatarsalgia from happening again  Your therapist can also teach you exercises to help relieve your pain  When should I contact my healthcare provider? · You develop knee, back, or hip pain  · You have more pain or redness in the foot  · You have questions or concerns about your condition or care  CARE AGREEMENT:   You have the right to help plan your care  Learn about your health condition and how it may be treated  Discuss treatment options with your caregivers to decide what care you want to receive  You always have the right to refuse treatment  The above information is an  only  It is not intended as medical advice for individual conditions or treatments  Talk to your doctor, nurse or pharmacist before following any medical regimen to see if it is safe and effective for you  © 2017 [address] Information is for End User's use only and may not be sold, redistributed or otherwise used for commercial purposes  All illustrations and images included in CareNotes® are the copyrighted property of A D [employer] M [employer]  or Jorge Gonzalez  Metatarsalgia   AMBULATORY CARE:   Metatarsalgia  is pain in the ball of your foot, near your second, third, and fourth toes  Common signs and symptoms of metatarsalgia:  Symptoms usually develop over time, but you may have sudden pain from an injury   You may have any of the following:  · Pain at the ball of your foot or near your toes that gets worse when you walk or stand, especially on hard surfaces    · Pain during exercises such as running    · Sharp or shooting pain in your toes that may get worse when you flex your toes    · Tingling or numbness in your toes    · Feeling like you are walking over rocks, or that you have a bruise    · A change in the way you walk because you try to avoid putting pressure on the ball of your foot  Contact your healthcare provider if:   · You develop knee, back, or hip pain  · You have more pain or redness in the foot  · You have questions or concerns about your condition or care  Treatment:  The cause of your metatarsalgia will be treated, if possible  You may also need any of the following:  · NSAIDs , such as ibuprofen, help decrease swelling, pain, and fever  This medicine is available with or without a doctor's order  NSAIDs can cause stomach bleeding or kidney problems in certain people  If you take blood thinner medicine, always ask if NSAIDs are safe for you  Always read the medicine label and follow directions  Do not give these medicines to children under 10months of age without direction from your child's healthcare provider  · Ultrasound  may be used to relieve your pain  Sound waves from the ultrasound can help send heat deeper into your tissues  · A steroid injection  may help decrease inflammation  · Surgery  may be needed if other treatments do not work  Surgery is used to align the bones near your toes  You may also need surgery to fix a problem such as hammertoe  Manage or prevent metatarsalgia:   · Rest your foot  If you play sports, you may not be able to do weight-bearing exercises  Examples include swimming and bike riding  Ask your healthcare provider which exercises are safe for you  · Apply ice as directed  Ice helps reduce pain and swelling  Use an ice pack, or put crushed ice in a plastic bag  Cover the pack or bag with a towel before you apply it to your foot  Apply ice for 15 to 20 minutes every hour, or as directed  · Use a cane or crutch if directed  These devices may help take pressure off your foot while it heals  · Wear proper shoes  Do not wear shoes that are narrow or tight  You may need to wear shoes that are wider than you usually wear   Choose shoes that do not have a raised heel  Shock-absorbing shoes can help prevent injury  These shoes will have extra support under your feet and toes  You can also add shoe cushions inside your shoes or to the bottoms of your feet, near your toes  The cushions may provide more support and make walking or standing more comfortable  Arch supports may help take pressure off your toes  · Reach or maintain a healthy weight  Extra weight can put pressure on your feet  Talk to your healthcare provider about a healthy weight for you  Your provider can help you create a safe weight loss plan if you are overweight  · Go to physical therapy if directed  A physical therapist can help improve your strength and range of motion  The therapist can also help you improve the way you walk to prevent metatarsalgia from happening again  Your therapist can also teach you exercises to help relieve your pain  Follow up with your healthcare provider as directed:  Write down your questions so you remember to ask them during your visits  © 2017 [address]  Information is for End User's use only and may not be sold, redistributed or otherwise used for commercial purposes  All illustrations and images included in CareNotes® are the copyrighted property of A D A M , Inc  or Jorge Gonzalez  The above information is an  only  It is not intended as medical advice for individual conditions or treatments  Talk to your doctor, nurse or pharmacist before following any medical regimen to see if it is safe and effective for you

## 2018-12-21 ENCOUNTER — HOSPITAL ENCOUNTER (OUTPATIENT)
Dept: RADIOLOGY | Facility: HOSPITAL | Age: 73
Discharge: HOME | End: 2018-12-21
Payer: MEDICARE

## 2018-12-21 ENCOUNTER — HOSPITAL ENCOUNTER (OUTPATIENT)
Dept: ULTRASOUND IMAGING | Facility: HOSPITAL | Age: 73
Discharge: HOME | End: 2018-12-21
Payer: MEDICARE

## 2018-12-21 DIAGNOSIS — Z85.528 HISTORY OF KIDNEY CANCER: ICD-10-CM

## 2018-12-21 DIAGNOSIS — Z90.5 HISTORY OF NEPHRECTOMY: ICD-10-CM

## 2018-12-21 PROCEDURE — 71046 X-RAY EXAM CHEST 2 VIEWS: CPT

## 2018-12-21 PROCEDURE — 76770 US EXAM ABDO BACK WALL COMP: CPT

## 2018-12-26 ENCOUNTER — HOSPITAL ENCOUNTER (EMERGENCY)
Facility: HOSPITAL | Age: 73
Discharge: HOME | End: 2018-12-26
Attending: EMERGENCY MEDICINE | Admitting: EMERGENCY MEDICINE
Payer: MEDICARE

## 2018-12-26 VITALS
HEART RATE: 60 BPM | BODY MASS INDEX: 29.25 KG/M2 | HEIGHT: 66 IN | SYSTOLIC BLOOD PRESSURE: 176 MMHG | WEIGHT: 182 LBS | TEMPERATURE: 97.2 F | OXYGEN SATURATION: 98 % | DIASTOLIC BLOOD PRESSURE: 64 MMHG | RESPIRATION RATE: 18 BRPM

## 2018-12-26 DIAGNOSIS — N28.9 RENAL INSUFFICIENCY: ICD-10-CM

## 2018-12-26 DIAGNOSIS — R60.9 DEPENDENT EDEMA: Primary | ICD-10-CM

## 2018-12-26 LAB
ALBUMIN SERPL BCP-MCNC: 4 G/DL (ref 3.5–5.7)
ALP SERPL-CCNC: 42 U/L (ref 55–165)
ALT SERPL W P-5'-P-CCNC: 16 U/L (ref 7–52)
ANION GAP SERPL CALCULATED.3IONS-SCNC: 8 MMOL/L (ref 4–13)
AST SERPL W P-5'-P-CCNC: 12 U/L (ref 13–39)
ATRIAL RATE: 59 BPM
BASOPHILS # BLD AUTO: 0.1 THOUSANDS/ΜL (ref 0–0.1)
BASOPHILS NFR BLD AUTO: 1 % (ref 0–1)
BILIRUB SERPL-MCNC: 0.5 MG/DL (ref 0.2–1)
BILIRUB UR QL STRIP: NEGATIVE
BNP SERPL-MCNC: 128 PG/ML (ref 1–100)
BUN SERPL-MCNC: 25 MG/DL (ref 7–25)
CALCIUM SERPL-MCNC: 9.7 MG/DL (ref 8.6–10.5)
CHLORIDE SERPL-SCNC: 105 MMOL/L (ref 98–107)
CLARITY UR: CLEAR
CO2 SERPL-SCNC: 30 MMOL/L (ref 21–31)
COLOR UR: YELLOW
CREAT SERPL-MCNC: 1.34 MG/DL (ref 0.6–1.2)
EOSINOPHIL # BLD AUTO: 0.1 THOUSAND/ΜL (ref 0–0.61)
EOSINOPHIL NFR BLD AUTO: 3 % (ref 0–6)
ERYTHROCYTE [DISTWIDTH] IN BLOOD BY AUTOMATED COUNT: 14.6 % (ref 11.6–15.1)
ERYTHROCYTE [SEDIMENTATION RATE] IN BLOOD: 10 MM/HOUR (ref 0–30)
GFR SERPL CREATININE-BSD FRML MDRD: 39 ML/MIN/1.73SQ M
GLUCOSE SERPL-MCNC: 132 MG/DL (ref 65–140)
GLUCOSE UR STRIP-MCNC: NEGATIVE MG/DL
HCT VFR BLD AUTO: 38.7 % (ref 37–47)
HGB BLD-MCNC: 13 G/DL (ref 11.5–15.4)
HGB UR QL STRIP.AUTO: NEGATIVE
KETONES UR STRIP-MCNC: NEGATIVE MG/DL
LEUKOCYTE ESTERASE UR QL STRIP: NEGATIVE
LYMPHOCYTES # BLD AUTO: 1.2 THOUSANDS/ΜL (ref 0.6–4.47)
LYMPHOCYTES NFR BLD AUTO: 27 % (ref 14–44)
MCH RBC QN AUTO: 31 PG (ref 26.8–34.3)
MCHC RBC AUTO-ENTMCNC: 33.6 G/DL (ref 31.4–37.4)
MCV RBC AUTO: 92 FL (ref 82–98)
MONOCYTES # BLD AUTO: 0.4 THOUSAND/ΜL (ref 0.17–1.22)
MONOCYTES NFR BLD AUTO: 10 % (ref 4–12)
NEUTROPHILS # BLD AUTO: 2.7 THOUSANDS/ΜL (ref 1.85–7.62)
NEUTS SEG NFR BLD AUTO: 59 % (ref 43–75)
NITRITE UR QL STRIP: NEGATIVE
NRBC BLD AUTO-RTO: 0 /100 WBCS
P AXIS: 59 DEGREES
PH UR STRIP.AUTO: 6 [PH] (ref 5–8)
PLATELET # BLD AUTO: 275 THOUSANDS/UL (ref 149–390)
PMV BLD AUTO: 8.4 FL (ref 8.9–12.7)
POTASSIUM SERPL-SCNC: 4.3 MMOL/L (ref 3.5–5.5)
PR INTERVAL: 162 MS
PROT SERPL-MCNC: 6.3 G/DL (ref 6.4–8.9)
PROT UR STRIP-MCNC: NEGATIVE MG/DL
QRS AXIS: 9 DEGREES
QRSD INTERVAL: 124 MS
QT INTERVAL: 462 MS
QTC INTERVAL: 457 MS
RBC # BLD AUTO: 4.2 MILLION/UL (ref 3.81–5.12)
SODIUM SERPL-SCNC: 143 MMOL/L (ref 134–143)
SP GR UR STRIP.AUTO: 1.02 (ref 1–1.03)
T WAVE AXIS: 0 DEGREES
TROPONIN I SERPL-MCNC: <0.03 NG/ML
UROBILINOGEN UR QL STRIP.AUTO: 0.2 E.U./DL
VENTRICULAR RATE: 59 BPM
WBC # BLD AUTO: 4.5 THOUSAND/UL (ref 4.31–10.16)

## 2018-12-26 PROCEDURE — 83880 ASSAY OF NATRIURETIC PEPTIDE: CPT | Performed by: EMERGENCY MEDICINE

## 2018-12-26 PROCEDURE — 36415 COLL VENOUS BLD VENIPUNCTURE: CPT | Performed by: EMERGENCY MEDICINE

## 2018-12-26 PROCEDURE — 99284 EMERGENCY DEPT VISIT MOD MDM: CPT

## 2018-12-26 PROCEDURE — 85025 COMPLETE CBC W/AUTO DIFF WBC: CPT | Performed by: EMERGENCY MEDICINE

## 2018-12-26 PROCEDURE — 81003 URINALYSIS AUTO W/O SCOPE: CPT | Performed by: EMERGENCY MEDICINE

## 2018-12-26 PROCEDURE — 85652 RBC SED RATE AUTOMATED: CPT | Performed by: EMERGENCY MEDICINE

## 2018-12-26 PROCEDURE — 93005 ELECTROCARDIOGRAM TRACING: CPT

## 2018-12-26 PROCEDURE — 80053 COMPREHEN METABOLIC PANEL: CPT | Performed by: EMERGENCY MEDICINE

## 2018-12-26 PROCEDURE — 84484 ASSAY OF TROPONIN QUANT: CPT | Performed by: EMERGENCY MEDICINE

## 2018-12-26 PROCEDURE — 93010 ELECTROCARDIOGRAM REPORT: CPT | Performed by: INTERNAL MEDICINE

## 2018-12-26 RX ORDER — MELOXICAM 7.5 MG/1
7.5 TABLET ORAL DAILY
COMMUNITY
End: 2019-06-19 | Stop reason: ALTCHOICE

## 2018-12-26 NOTE — DISCHARGE INSTRUCTIONS
Impaired Kidney Function   WHAT YOU NEED TO KNOW:   Impaired kidney function is when your kidneys are not working as well as they should  Normally, kidneys remove fluid, chemicals, and waste from your blood  These wastes are removed from your body in the urine made by your kidneys  If impaired kidney function is not treated or gets worse, it may lead to long-term kidney disease or kidney failure  DISCHARGE INSTRUCTIONS:   Return to the emergency department if:   · You have fluid buildup in your legs  · You have trouble breathing  · You urinate less than you normally do  · You have dark colored urine  Contact your healthcare provider if:   · You have a fever  · You have abdominal or low back pain  · Your skin is itchy or you have a rash  · You have nausea, vomit repeatedly, or have severe diarrhea  · You have fatigue or muscle weakness  · You have hiccups that will not stop  · You have questions or concerns about your condition or care  Follow up with your healthcare provider as directed: You will need to return for tests to find the cause of your impaired kidney function  Write down your questions so you remember to ask them during your visits  Support kidney function:   · Manage other health conditions  such as diabetes, high blood pressure, or heart disease  These conditions stress your kidneys  · Talk to your healthcare provider before you take over-the-counter-medicine  NSAIDs, stomach medicine, or laxatives may harm your kidneys  · Limit alcohol  Ask how much alcohol is safe for you to drink  A drink of alcohol is 12 ounces of beer, 5 ounces of wine, or 1½ ounces of liquor  · Do not smoke  Nicotine can damage blood vessels and make it more difficult to manage your impaired kidney function  Smoking also harms your kidneys  Do not use e-cigarettes or smokeless tobacco in place of cigarettes or to help you quit  They still contain nicotine   Ask your healthcare provider for information if you currently smoke and need help quitting  © 2017 [address] Information is for End User's use only and may not be sold, redistributed or otherwise used for commercial purposes  All illustrations and images included in CareNotes® are the copyrighted property of [employer]  or Jorge Gonzalez  The above information is an  only  It is not intended as medical advice for individual conditions or treatments  Talk to your doctor, nurse or pharmacist before following any medical regimen to see if it is safe and effective for you  Leg Edema   WHAT YOU NEED TO KNOW:   Leg edema is swelling caused by fluid buildup  Your legs may swell if you sit or stand for long periods of time, are pregnant, or are injured  Swelling may also occur if you have heart failure or circulation problems  This means that your heart does not pump blood through your body as it should  DISCHARGE INSTRUCTIONS:   Self-care:   · Elevate your legs:  Raise your legs above the level of your heart as often as you can  This will help decrease swelling and pain  Prop your legs on pillows or blankets to keep them elevated comfortably  · Wear pressure stockings: These tight stockings put pressure on your legs to promote blood flow and prevent blood clots  Wear the stockings during the day  Do not wear them while you sleep  · Apply heat:  Heat helps decrease pain and swelling  Apply heat on the area for 20 to 30 minutes every 2 hours for as many days as directed  · Stay active:  Do not stand or sit for long periods of time  Ask your healthcare provider about the best exercise plan for you  · Eat healthy foods:  Healthy foods include fruits, vegetables, whole-grain breads, low-fat dairy products, beans, lean meats, and fish  Ask if you need to be on a special diet  Limit salt  Salt will make your body hold even more fluid    Follow up with your healthcare provider as directed:  Write down your questions so you remember to ask them during your visits  Contact your healthcare provider if:   · You have a fever or feel more tired than usual     · The veins in your legs look larger than usual  They may look full or bulging  · Your legs itch or feel heavy  · You have red or white areas or sores on your legs  The skin may also appear dimpled or have indentations  · You are gaining weight  · You have trouble moving your ankles  · The swelling does not go away, or other parts of your body swell  · You have questions or concerns about your condition or care  Return to the emergency department if:   · You cannot walk  · You feel faint or confused  · Your skin turns blue or gray  · Your leg feels warm, tender, and painful  It may be swollen and red  · You have chest pain or trouble breathing that is worse when you lie down  · You suddenly feel lightheaded and have trouble breathing  · You have new and sudden chest pain  You may have more pain when you take deep breaths or cough  You may also cough up blood  © 2017 [address]  Information is for End User's use only and may not be sold, redistributed or otherwise used for commercial purposes  All illustrations and images included in CareNotes® are the copyrighted property of A [employer] M [employer]  or Jorge Gonzalez  The above information is an  only  It is not intended as medical advice for individual conditions or treatments  Talk to your doctor, nurse or pharmacist before following any medical regimen to see if it is safe and effective for you

## 2018-12-26 NOTE — ED NOTES
IV established and labs drawn as ordered - patient aware urine sample is needed when able to void - monitoring in place -  at [facility], [address] [place] [address]  12/26/18 1232

## 2018-12-26 NOTE — ED NOTES
MD at bedside discussing recent ultrasound - patient now able to give urine sample and ambulatory     Jeison Mcdaniel RN  12/26/18 [id number] Detailed

## 2018-12-26 NOTE — ED PROVIDER NOTES
History  Chief Complaint   Patient presents with    Ankle Swelling     reports ankle/feet swelling and hands swelling for 3 weeks - followed up with fmd and put on mobic for the feet pain     Patient reports to the emergency department after 2 to 3 weeks of foot swelling left foot pain and hand swelling  Patient was seen in the emergency department recently and had an x-ray of the foot  Patient was seen by her primary care provider, Dr Tony Cohen, and started on Mobic for swelling and pain  Patient was also seen by her specialist that she sees annually following her kidney cancer and patient had a recent CT scan the abdomen and pelvis  Patient states he feels of something wrong in herbody and is concerned that she is becoming "the boy who cried story "        History provided by:  Patient and spouse      Prior to Admission Medications   Prescriptions Last Dose Informant Patient Reported? Taking?    ALENDRONATE SODIUM PO  Self Yes No   Sig: Take 70 mg by mouth weekly    Biotin 10 MG TABS  Self Yes No   Sig: Take by mouth   Cholecalciferol (VITAMIN D) 2000 units CAPS  Self Yes No   Sig: Take by mouth   Ginger, Zingiber officinalis, (GINGER PO)  Self Yes No   Sig: Take 500 mg by mouth daily   MAGNESIUM PO  Self Yes No   Sig: Take 250 mg by mouth daily   Omega-3 Fatty Acids ([employer] NATURAL FISH OIL) 1000 MG CAPS  Self Yes No   Sig: Take 2 capsules by mouth daily   Red Yeast Rice Extract (RED YEAST RICE PO)  Self Yes No   Sig: Take 2,400 mg by mouth daily   TURMERIC PO  Self Yes No   Sig: Take 538 mg by mouth daily   acetaminophen (TYLENOL) 325 mg tablet  Self No No   Sig: Take 2 tablets by mouth every 6 (six) hours as needed for headaches   amLODIPine (NORVASC) 5 mg tablet  Self Yes No   Sig: Take 5 mg by mouth daily   aspirin (ECOTRIN LOW STRENGTH) 81 mg EC tablet  Self Yes No   Sig: Take 81 mg by mouth daily   atenolol (TENORMIN) 25 mg tablet  Self Yes No   Sig: Take 1 tablet by mouth daily   calcium carbonate (OS-SUSAN) 600 MG tablet   Yes No   Sig: Take 1,200 mg by mouth daily   ezetimibe (ZETIA) 10 mg tablet  Self Yes No   Sig: Take 10 mg by mouth daily   meloxicam (MOBIC) 7 5 mg tablet   Yes Yes   Sig: Take 7 5 mg by mouth daily   pantoprazole (PROTONIX) 20 mg tablet   No No   Sig: Take 1 tablet (20 mg total) by mouth daily for 10 days   psyllium (METAMUCIL) 58 6 % packet  Self Yes No   Sig: Take 1 packet by mouth daily      Facility-Administered Medications: None       Past Medical History:   Diagnosis Date    Arthritis     Back pain     [facility] [address] [facility]     Cancer of kidney ([facility] [address] [facility]     Claustrophobia     Diverticula of colon     Ductal carcinoma in situ of breast     Hyperlipidemia     Hypertension     Kidney stone     Myocardial ischemia     Renal mass     Right bundle branch block     Stress incontinence        Past Surgical History:   Procedure Laterality Date    BREAST SURGERY      left  breast lumpectomy-benign    GANGLION CYST EXCISION      HYSTERECTOMY      KIDNEY SURGERY      left kidney removal    NEPHRECTOMY Left     cancer    PLANTAR FASCIECTOMY Left     REPLACEMENT TOTAL KNEE Bilateral     SKIN CANCER EXCISION      melanoma removed left forearm and right lower back       History reviewed  No pertinent family history  I have reviewed and agree with the history as documented  Social History   Substance Use Topics    Smoking status: Never Smoker    Smokeless tobacco: Never Used    Alcohol use No        Review of Systems   Constitutional: Negative for chills and fever  HENT: Negative for rhinorrhea and sore throat  Eyes: Negative for visual disturbance  Respiratory: Negative for cough and shortness of breath  Denies STANLEY   Cardiovascular: Negative for chest pain and leg swelling  Gastrointestinal: Negative for abdominal pain, diarrhea, nausea and vomiting  Genitourinary: Negative for dysuria     Musculoskeletal: Positive for arthralgias (foot pain left > right) and joint swelling (bilateral feet worse than bilateral hands)  Negative for back pain and myalgias  Skin: Negative for rash  Neurological: Negative for dizziness and headaches  Psychiatric/Behavioral: Negative for confusion  All other systems reviewed and are negative  Physical Exam  Physical Exam   Constitutional: She is oriented to person, place, and time  She appears well-developed and well-nourished  HENT:   Nose: Nose normal    Mouth/Throat: Oropharynx is clear and moist  No oropharyngeal exudate  Eyes: Pupils are equal, round, and reactive to light  Conjunctivae and EOM are normal  No scleral icterus  Neck: Normal range of motion  Neck supple  No JVD present  No tracheal deviation present  Cardiovascular: Normal rate, regular rhythm and normal heart sounds  No murmur heard  Pulmonary/Chest: Effort normal and breath sounds normal  No respiratory distress  She has no wheezes  She has no rales  Abdominal: Soft  Bowel sounds are normal  There is no tenderness  There is no guarding  Musculoskeletal: Normal range of motion  She exhibits edema (bilateral dorsal feet, no overt edema seen to the hands) and tenderness (left dorsal foot tenderness, no erythema )  She exhibits no deformity  Neurological: She is alert and oriented to person, place, and time  No cranial nerve deficit or sensory deficit  She exhibits normal muscle tone  5/5 motor, nl sens   Skin: Skin is warm and dry  Psychiatric: She has a normal mood and affect  Her behavior is normal    Nursing note and vitals reviewed        Vital Signs  ED Triage Vitals [12/26/18 0835]   Temperature Pulse Respirations Blood Pressure SpO2   (!) 97 2 °F (36 2 °C) 60 18 163/68 98 %      Temp Source Heart Rate Source Patient Position - Orthostatic VS BP Location FiO2 (%)   Tympanic -- -- Left arm --      Pain Score       No Pain           Vitals:    12/26/18 0835 12/26/18 0907   BP: 163/68 (!) 176/64   Pulse: 60 60       Visual Acuity      ED Medications  Medications - No data to display    Diagnostic Studies  Results Reviewed     Procedure Component Value Units Date/Time    Sedimentation rate, automated [[id number]]  (Normal) Collected:  12/26/18 0906    Lab Status:  Final result Specimen:  Blood from Arm, Left Updated:  12/26/18 1320     Sed Rate 10 mm/hour     UA w Reflex to Microscopic w Reflex to Culture [[id number]]  (Normal) Collected:  12/26/18 0946    Lab Status:  Final result Specimen:  Urine from Urine, Clean Catch Updated:  12/26/18 1003     Color, UA Yellow     Clarity, UA Clear     Specific Gravity, UA 1 025     pH, UA 6 0     Leukocytes, UA Negative     Nitrite, UA Negative     Protein, UA Negative mg/dl      Glucose, UA Negative mg/dl      Ketones, UA Negative mg/dl      Urobilinogen, UA 0 2 E U /dl      Bilirubin, UA Negative     Blood, UA Negative    Troponin I [[id number]]  (Normal) Collected:  12/26/18 0906    Lab Status:  Final result Specimen:  Blood from Arm, Left Updated:  12/26/18 0949     Troponin I <0 03 ng/mL     B-Type Natriuretic Peptide [facility] ONLY) [[id number]]  (Abnormal) Collected:  12/26/18 0906    Lab Status:  Final result Specimen:  Blood from Arm, Left Updated:  12/26/18 0949      (H) pg/mL     Comprehensive metabolic panel [[id number]]  (Abnormal) Collected:  12/26/18 0906    Lab Status:  Final result Specimen:  Blood from Arm, Left Updated:  12/26/18 0949     Sodium 143 mmol/L      Potassium 4 3 mmol/L      Chloride 105 mmol/L      CO2 30 mmol/L      ANION GAP 8 mmol/L      BUN 25 mg/dL      Creatinine 1 34 (H) mg/dL      Glucose 132 mg/dL      Calcium 9 7 mg/dL      AST 12 (L) U/L      ALT 16 U/L      Alkaline Phosphatase 42 (L) U/L      Total Protein 6 3 (L) g/dL      Albumin 4 0 g/dL      Total Bilirubin 0 50 mg/dL      eGFR 39 ml/min/1 73sq m     Narrative:         National Kidney Disease Education Program recommendations are as follows:  GFR calculation is accurate only with a steady state creatinine  Chronic Kidney disease less than 60 ml/min/1 73 sq  meters  Kidney failure less than 15 ml/min/1 73 sq  meters  CBC and differential [[id number]]  (Abnormal) Collected:  12/26/18 0906    Lab Status:  Final result Specimen:  Blood from Arm, Left Updated:  12/26/18 0916     WBC 4 50 Thousand/uL      RBC 4 20 Million/uL      Hemoglobin 13 0 g/dL      Hematocrit 38 7 %      MCV 92 fL      MCH 31 0 pg      MCHC 33 6 g/dL      RDW 14 6 %      MPV 8 4 (L) fL      Platelets 991 Thousands/uL      nRBC 0 /100 WBCs      Neutrophils Relative 59 %      Lymphocytes Relative 27 %      Monocytes Relative 10 %      Eosinophils Relative 3 %      Basophils Relative 1 %      Neutrophils Absolute 2 70 Thousands/µL      Lymphocytes Absolute 1 20 Thousands/µL      Monocytes Absolute 0 40 Thousand/µL      Eosinophils Absolute 0 10 Thousand/µL      Basophils Absolute 0 10 Thousands/µL                  No orders to display              Procedures  Procedures       Phone Contacts  ED Phone Contact    ED Course  ED Course as of Dec 26 1438   Wed Dec 26, 2018   0848 Sinus bradycardia with a rate of 59 beats per minute and no ectopy, right bundle branch block, normal axis, atrial abnormality, no STEMI seen  ECG 12 lead   1129 Results reviewed with patient and her spouse  Patient's spouse reports that patient has been packing can be for the last 2-3 weeks and sitting in a chair more than she usually does  All results diagnosis and plan of follow-up reviewed with patient and her spouse  All questions answered fully to the satisfaction of the patient and her spouse                                  MDM  CritCare Time    Disposition  Final diagnoses:   Dependent edema   Renal insufficiency     Time reflects when diagnosis was documented in both MDM as applicable and the Disposition within this note     Time User Action Codes Description Comment    12/26/2018 11:30 AM Neal Espinoza Add [R60 9] Dependent edema     12/26/2018 11:30 AM Karen Pérez Add [N28 9] Renal insufficiency       ED Disposition     ED Disposition Condition Comment    Discharge  [address] discharge to home/self care      Condition at discharge: Stable        Follow-up Information     Follow up With Specialties Details Why [address] [facility], [address] In 3 days  [facility] [place] [address]  [phone]            Discharge Medication List as of 12/26/2018 11:31 AM      CONTINUE these medications which have NOT CHANGED    Details   meloxicam (MOBIC) 7 5 mg tablet Take 7 5 mg by mouth daily, Historical Med      acetaminophen (TYLENOL) 325 mg tablet Take 2 tablets by mouth every 6 (six) hours as needed for headaches, Starting Mon 2/13/2017, No Print      ALENDRONATE SODIUM PO Take 70 mg by mouth weekly , Historical Med      amLODIPine (NORVASC) 5 mg tablet Take 5 mg by mouth daily, Historical Med      aspirin (ECOTRIN LOW STRENGTH) 81 mg EC tablet Take 81 mg by mouth daily, Historical Med      atenolol (TENORMIN) 25 mg tablet Take 1 tablet by mouth daily, Starting Fri 12/8/2017, Historical Med      Biotin 10 MG TABS Take by mouth, Historical Med      calcium carbonate (OS-SUSAN) 600 MG tablet Take 1,200 mg by mouth daily, Historical Med      Cholecalciferol (VITAMIN D) 2000 units CAPS Take by mouth, Historical Med      ezetimibe (ZETIA) 10 mg tablet Take 10 mg by mouth daily, Historical Med      Ginger, Zingiber officinalis, (GINGER PO) Take 500 mg by mouth daily, Historical Med      MAGNESIUM PO Take 250 mg by mouth daily, Historical Med      Omega-3 Fatty Acids ([employer] NATURAL FISH OIL) 1000 MG CAPS Take 2 capsules by mouth daily, Historical Med      pantoprazole (PROTONIX) 20 mg tablet Take 1 tablet (20 mg total) by mouth daily for 10 days, Starting Sun 9/30/2018, Until Wed 10/10/2018, Normal      psyllium (METAMUCIL) 58 6 % packet Take 1 packet by mouth daily, Historical Med      Red Yeast Rice Extract (RED YEAST RICE PO) Take 2,400 mg by mouth daily, Historical Med      TURMERIC PO Take 538 mg by mouth daily, Historical Med           No discharge procedures on file      ED Provider  Electronically Signed by           Mirian Sandifer, DO  12/26/18 3000

## 2019-01-07 ENCOUNTER — TRANSCRIBE ORDERS (OUTPATIENT)
Dept: ADMINISTRATIVE | Facility: HOSPITAL | Age: 74
End: 2019-01-07

## 2019-01-07 ENCOUNTER — LAB (OUTPATIENT)
Dept: LAB | Facility: HOSPITAL | Age: 74
End: 2019-01-07
Payer: MEDICARE

## 2019-01-07 DIAGNOSIS — M79.672 LEFT FOOT PAIN: ICD-10-CM

## 2019-01-07 DIAGNOSIS — M79.672 LEFT FOOT PAIN: Primary | ICD-10-CM

## 2019-01-07 LAB — URATE SERPL-MCNC: 6 MG/DL (ref 2.3–7.6)

## 2019-01-07 PROCEDURE — 36415 COLL VENOUS BLD VENIPUNCTURE: CPT

## 2019-01-07 PROCEDURE — 84550 ASSAY OF BLOOD/URIC ACID: CPT

## 2019-02-25 ENCOUNTER — TRANSCRIBE ORDERS (OUTPATIENT)
Dept: ADMINISTRATIVE | Facility: HOSPITAL | Age: 74
End: 2019-02-25

## 2019-02-25 ENCOUNTER — LAB (OUTPATIENT)
Dept: LAB | Facility: HOSPITAL | Age: 74
End: 2019-02-25
Payer: MEDICARE

## 2019-02-25 DIAGNOSIS — M20.10 ACQUIRED HALLUX VALGUS, UNSPECIFIED LATERALITY: Primary | ICD-10-CM

## 2019-02-25 DIAGNOSIS — M20.42 HAMMER TOE OF SECOND TOE OF LEFT FOOT: ICD-10-CM

## 2019-02-25 DIAGNOSIS — M79.672 FOOT PAIN, LEFT: ICD-10-CM

## 2019-02-27 ENCOUNTER — OFFICE VISIT (OUTPATIENT)
Dept: CARDIOLOGY CLINIC | Facility: CLINIC | Age: 74
End: 2019-02-27
Payer: MEDICARE

## 2019-02-27 VITALS
WEIGHT: 186 LBS | DIASTOLIC BLOOD PRESSURE: 60 MMHG | HEART RATE: 68 BPM | BODY MASS INDEX: 29.89 KG/M2 | HEIGHT: 66 IN | SYSTOLIC BLOOD PRESSURE: 148 MMHG

## 2019-02-27 DIAGNOSIS — E78.2 MIXED HYPERLIPIDEMIA: ICD-10-CM

## 2019-02-27 DIAGNOSIS — I45.10 RBBB: ICD-10-CM

## 2019-02-27 DIAGNOSIS — I10 ESSENTIAL HYPERTENSION: Primary | ICD-10-CM

## 2019-02-27 DIAGNOSIS — Z01.810 PREOP CARDIOVASCULAR EXAM: ICD-10-CM

## 2019-02-27 PROCEDURE — 93000 ELECTROCARDIOGRAM COMPLETE: CPT | Performed by: INTERNAL MEDICINE

## 2019-02-27 PROCEDURE — 99213 OFFICE O/P EST LOW 20 MIN: CPT | Performed by: INTERNAL MEDICINE

## 2019-02-27 NOTE — PROGRESS NOTES
Cardiology Follow Up    Clare Solomon [facility]  1945  [id number]  [address] [facility] [address] [place] [address]  [phone]    1  Essential hypertension  POCT ECG   2  Preop cardiovascular exam     3  RBBB  POCT ECG   4  Mixed hyperlipidemia           Discussion/Summary: All of her assessed cardiac problems are stable  I have reviewed her medications and made no changes  I feel that her cardiac risk for foot surgery is low and she is cleared without further cardiac testing needed  RTO 1 year  Interval History: She continues to swim 2 x a week and denies CP, SOB, palpitations  She had a regular stress test on 3/22/2017 which was negative for ischemia  She has a chronic RBBB  ECG today shows NSR, RBBB  HR 68 BPM     She is scheduled for foot surgery in the near future      Patient Active Problem List   Diagnosis    Chest tightness    GERD (gastroesophageal reflux disease)    Essential hypertension    HLD (hyperlipidemia)    S/p nephrectomy    RBBB    Preop cardiovascular exam     Past Medical History:   Diagnosis Date    Arthritis     Back pain     [facility] [address] [facility]     Cancer of kidney [facility] [address] [facility]     Claustrophobia     Diverticula of colon     Ductal carcinoma in situ of breast     Hyperlipidemia     Hypertension     Kidney stone     Myocardial ischemia     Renal mass     Right bundle branch block     Stress incontinence      Social History     Socioeconomic History    Marital status: /Civil Union     Spouse name: Not on file    Number of children: Not on file    Years of education: Not on file    Highest education level: Not on file   Occupational History    Not on file   Social Needs    Financial resource strain: Not on file    Food insecurity:     Worry: Not on file     Inability: Not on file    Transportation needs:     Medical: Not on file     Non-medical: Not on file Tobacco Use    Smoking status: Never Smoker    Smokeless tobacco: Never Used   Substance and Sexual Activity    Alcohol use: No    Drug use: No    Sexual activity: Not on file   Lifestyle    Physical activity:     Days per week: Not on file     Minutes per session: Not on file    Stress: Not on file   Relationships    Social connections:     Talks on phone: Not on file     Gets together: Not on file     Attends Taoist service: Not on file     Active member of club or organization: Not on file     Attends meetings of clubs or organizations: Not on file     Relationship status: Not on file    Intimate partner violence:     Fear of current or ex partner: Not on file     Emotionally abused: Not on file     Physically abused: Not on file     Forced sexual activity: Not on file   Other Topics Concern    Not on file   Social History Narrative    Not on file      No family history on file    Past Surgical History:   Procedure Laterality Date    BREAST SURGERY      left  breast lumpectomy-benign    GANGLION CYST EXCISION      HYSTERECTOMY      KIDNEY SURGERY      left kidney removal    NEPHRECTOMY Left     cancer    PLANTAR FASCIECTOMY Left     REPLACEMENT TOTAL KNEE Bilateral     SKIN CANCER EXCISION      melanoma removed left forearm and right lower back       Current Outpatient Medications:     acetaminophen (TYLENOL) 325 mg tablet, Take 2 tablets by mouth every 6 (six) hours as needed for headaches, Disp: 30 tablet, Rfl: 0    ALENDRONATE SODIUM PO, Take 70 mg by mouth weekly , Disp: , Rfl:     amLODIPine (NORVASC) 5 mg tablet, Take 5 mg by mouth daily, Disp: , Rfl:     aspirin (ECOTRIN LOW STRENGTH) 81 mg EC tablet, Take 81 mg by mouth daily, Disp: , Rfl:     atenolol (TENORMIN) 25 mg tablet, Take 1 tablet by mouth daily, Disp: , Rfl:     Biotin 10 MG TABS, Take by mouth, Disp: , Rfl:     calcium carbonate (OS-SUSAN) 600 MG tablet, Take 1,200 mg by mouth daily, Disp: , Rfl:    Cholecalciferol (VITAMIN D) 2000 units CAPS, Take by mouth, Disp: , Rfl:     ezetimibe (ZETIA) 10 mg tablet, Take 10 mg by mouth daily, Disp: , Rfl:     Nedra, Zingiber officinalis, (NEDRA PO), Take 500 mg by mouth daily, Disp: , Rfl:     MAGNESIUM PO, Take 250 mg by mouth daily, Disp: , Rfl:     meloxicam (MOBIC) 7 5 mg tablet, Take 7 5 mg by mouth daily, Disp: , Rfl:     Omega-3 Fatty Acids ([employer] NATURAL FISH OIL) 1000 MG CAPS, Take 2 capsules by mouth daily, Disp: , Rfl:     psyllium (METAMUCIL) 58 6 % packet, Take 1 packet by mouth daily, Disp: , Rfl:     Red Yeast Rice Extract (RED YEAST RICE PO), Take 2,400 mg by mouth daily, Disp: , Rfl:     TURMERIC PO, Take 538 mg by mouth daily, Disp: , Rfl:     pantoprazole (PROTONIX) 20 mg tablet, Take 1 tablet (20 mg total) by mouth daily for 10 days (Patient not taking: Reported on 2/27/2019), Disp: 10 tablet, Rfl: 0  Allergies   Allergen Reactions    Niacin Other (See Comments)     Other reaction(s): Other (See Comments)  Other reaction(s): flushed prickly sensation  Other reaction(s): flushed prickly sensation  Other reaction(s): flushed prickly sensation    Penicillins      Vitals:    02/27/19 0835   BP: 148/60   BP Location: Right arm   Patient Position: Sitting   Cuff Size: Standard   Pulse: 68   Weight: 84 4 kg (186 lb)   Height: 5' 6" (1 676 m)     Weight (last 2 days)     Date/Time   Weight    02/27/19 0835   84 4 (186)             Blood pressure 148/60, pulse 68, height 5' 6" (1 676 m), weight 84 4 kg (186 lb)  , Body mass index is 30 02 kg/m²      Labs:  Appointment on 02/25/2019   Component Date Value    Sodium 02/25/2019 141     Potassium 02/25/2019 4 3     Chloride 02/25/2019 106     CO2 02/25/2019 26     ANION GAP 02/25/2019 9     BUN 02/25/2019 25     Creatinine 02/25/2019 1 33*    Glucose, Fasting 02/25/2019 111*    Calcium 02/25/2019 9 4     eGFR 02/25/2019 40     WBC 02/25/2019 5 30     RBC 02/25/2019 4 32     Hemoglobin 02/25/2019 13 4     Hematocrit 02/25/2019 39 1     MCV 02/25/2019 90     MCH 02/25/2019 30 9     MCHC 02/25/2019 34 2     RDW 02/25/2019 14 6     MPV 02/25/2019 8 9     Platelets 40/34/8691 265     nRBC 02/25/2019 0     Neutrophils Relative 02/25/2019 43     Lymphocytes Relative 02/25/2019 45*    Monocytes Relative 02/25/2019 8     Eosinophils Relative 02/25/2019 3     Basophils Relative 02/25/2019 1     Neutrophils Absolute 02/25/2019 2 20     Lymphocytes Absolute 02/25/2019 2 40     Monocytes Absolute 02/25/2019 0 40     Eosinophils Absolute 02/25/2019 0 20     Basophils Absolute 02/25/2019 0 10    Lab on 01/07/2019   Component Date Value    Uric Acid 01/07/2019 6 0    Admission on 12/26/2018, Discharged on 12/26/2018   Component Date Value    WBC 12/26/2018 4 50     RBC 12/26/2018 4 20     Hemoglobin 12/26/2018 13 0     Hematocrit 12/26/2018 38 7     MCV 12/26/2018 92     MCH 12/26/2018 31 0     MCHC 12/26/2018 33 6     RDW 12/26/2018 14 6     MPV 12/26/2018 8 4*    Platelets 25/79/5958 275     nRBC 12/26/2018 0     Neutrophils Relative 12/26/2018 59     Lymphocytes Relative 12/26/2018 27     Monocytes Relative 12/26/2018 10     Eosinophils Relative 12/26/2018 3     Basophils Relative 12/26/2018 1     Neutrophils Absolute 12/26/2018 2 70     Lymphocytes Absolute 12/26/2018 1 20     Monocytes Absolute 12/26/2018 0 40     Eosinophils Absolute 12/26/2018 0 10     Basophils Absolute 12/26/2018 0 10     Sodium 12/26/2018 143     Potassium 12/26/2018 4 3     Chloride 12/26/2018 105     CO2 12/26/2018 30     ANION GAP 12/26/2018 8     BUN 12/26/2018 25     Creatinine 12/26/2018 1 34*    Glucose 12/26/2018 132     Calcium 12/26/2018 9 7     AST 12/26/2018 12*    ALT 12/26/2018 16     Alkaline Phosphatase 12/26/2018 42*    Total Protein 12/26/2018 6 3*    Albumin 12/26/2018 4 0     Total Bilirubin 12/26/2018 0 50     eGFR 12/26/2018 39     Sed Rate 12/26/2018 10  BNP 12/26/2018 128*    Troponin I 12/26/2018 <0 03     Color, UA 12/26/2018 Yellow     Clarity, UA 12/26/2018 Clear     Specific Gravity, UA 12/26/2018 1 025     pH, UA 12/26/2018 6 0     Leukocytes, UA 12/26/2018 Negative     Nitrite, UA 12/26/2018 Negative     Protein, UA 12/26/2018 Negative     Glucose, UA 12/26/2018 Negative     Ketones, UA 12/26/2018 Negative     Urobilinogen, UA 12/26/2018 0 2     Bilirubin, UA 12/26/2018 Negative     Blood, UA 12/26/2018 Negative     Ventricular Rate 12/26/2018 59     Atrial Rate 12/26/2018 59     MN Interval 12/26/2018 162     QRSD Interval 12/26/2018 124     QT Interval 12/26/2018 462     QTC Interval 12/26/2018 457     P Axis 12/26/2018 59     QRS Axis 12/26/2018 9     T Wave Axis 12/26/2018 0    Office Visit on 12/14/2018   Component Date Value     COLOR,UA 12/14/2018 yellow     CLARITY,UA 12/14/2018 clear     SPECIFIC GRAVITY,UA 12/14/2018 1 010      PH,UA 12/14/2018 5 0     LEUKOCYTE ESTERASE,UA 12/14/2018 neg     NITRITE,UA 12/14/2018 neg     GLUCOSE, UA 12/14/2018 neg     KETONES,UA 12/14/2018 neg     BILIRUBIN,UA 12/14/2018 neg     BLOOD,UA 12/14/2018 neg     POCT URINE PROTEIN 12/14/2018 neg     SL AMB POCT UROBILINOGEN 12/14/2018 0 2    Lab on 12/06/2018   Component Date Value    Sodium 12/06/2018 141     Potassium 12/06/2018 4 5     Chloride 12/06/2018 109*    CO2 12/06/2018 27     ANION GAP 12/06/2018 5     BUN 12/06/2018 23     Creatinine 12/06/2018 1 18     Glucose, Fasting 12/06/2018 117*    Calcium 12/06/2018 9 5     AST 12/06/2018 14     ALT 12/06/2018 14     Alkaline Phosphatase 12/06/2018 35*    Total Protein 12/06/2018 6 1*    Albumin 12/06/2018 4 1     Total Bilirubin 12/06/2018 0 50     eGFR 12/06/2018 46     Cholesterol 12/06/2018 227*    Triglycerides 12/06/2018 126     HDL, Direct 12/06/2018 43     LDL Calculated 12/06/2018 159*    Non-HDL-Chol (CHOL-HDL) 12/06/2018 184    Admission on 09/30/2018, Discharged on 09/30/2018   Component Date Value    WBC 09/30/2018 6 70     RBC 09/30/2018 4 29     Hemoglobin 09/30/2018 13 5     Hematocrit 09/30/2018 40 8     MCV 09/30/2018 95     MCH 09/30/2018 31 5     MCHC 09/30/2018 33 1     RDW 09/30/2018 14 5     MPV 09/30/2018 9 1     Platelets 75/72/0862 241     nRBC 09/30/2018 0     Neutrophils Relative 09/30/2018 43     Lymphocytes Relative 09/30/2018 45*    Monocytes Relative 09/30/2018 8     Eosinophils Relative 09/30/2018 2     Basophils Relative 09/30/2018 1     Neutrophils Absolute 09/30/2018 2 90     Lymphocytes Absolute 09/30/2018 3 00     Monocytes Absolute 09/30/2018 0 60     Eosinophils Absolute 09/30/2018 0 10     Basophils Absolute 09/30/2018 0 10     Protime 09/30/2018 10 4     INR 09/30/2018 0 91     PTT 09/30/2018 26     Sodium 09/30/2018 138     Potassium 09/30/2018 3 8     Chloride 09/30/2018 103     CO2 09/30/2018 25     ANION GAP 09/30/2018 10     BUN 09/30/2018 29*    Creatinine 09/30/2018 1 32*    Glucose 09/30/2018 110     Calcium 09/30/2018 10 3     eGFR 09/30/2018 40     Troponin I 09/30/2018 <0 03     Troponin I 09/30/2018 <0 03    Office Visit on 09/26/2018   Component Date Value    INTERPRETATIONS 09/26/2018     Appointment on 09/10/2018   Component Date Value    WBC 09/10/2018 5 20     RBC 09/10/2018 4 26     Hemoglobin 09/10/2018 13 6     Hematocrit 09/10/2018 40 6     MCV 09/10/2018 95     MCH 09/10/2018 31 9     MCHC 09/10/2018 33 4     RDW 09/10/2018 14 2     MPV 09/10/2018 9 2     Platelets 60/00/2980 258     nRBC 09/10/2018 0     Neutrophils Relative 09/10/2018 56     Lymphocytes Relative 09/10/2018 32     Monocytes Relative 09/10/2018 9     Eosinophils Relative 09/10/2018 2     Basophils Relative 09/10/2018 1     Neutrophils Absolute 09/10/2018 2 90     Lymphocytes Absolute 09/10/2018 1 70     Monocytes Absolute 09/10/2018 0 50     Eosinophils Absolute 09/10/2018 0 10  Basophils Absolute 09/10/2018 0 10     Sodium 09/10/2018 140     Potassium 09/10/2018 4 3     Chloride 09/10/2018 105     CO2 09/10/2018 28     ANION GAP 09/10/2018 7     BUN 09/10/2018 33*    Creatinine 09/10/2018 1 31*    Glucose, Fasting 09/10/2018 111*    Calcium 09/10/2018 9 9     AST 09/10/2018 14     ALT 09/10/2018 16     Alkaline Phosphatase 09/10/2018 39*    Total Protein 09/10/2018 6 4     Albumin 09/10/2018 4 0     Total Bilirubin 09/10/2018 0 60     eGFR 09/10/2018 40     Hemoglobin A1C 09/10/2018 5 4     EAG 09/10/2018 108     Cholesterol 09/10/2018 285*    Triglycerides 09/10/2018 176*    HDL, Direct 09/10/2018 48     LDL Calculated 09/10/2018 202*    Non-HDL-Chol (CHOL-HDL) 09/10/2018 237     TSH 3RD GENERATON 09/10/2018 2 090      Imaging: No results found  Review of Systems:  Review of Systems   Constitutional: Negative for diaphoresis, fatigue, fever and unexpected weight change  HENT: Negative  Respiratory: Negative for cough, shortness of breath and wheezing  Cardiovascular: Negative for chest pain, palpitations and leg swelling  Gastrointestinal: Negative for abdominal pain, diarrhea and nausea  Musculoskeletal: Negative for gait problem and myalgias  Skin: Negative for rash  Neurological: Negative for dizziness and numbness  Psychiatric/Behavioral: Negative  Physical Exam:  Physical Exam   Constitutional: She is oriented to person, place, and time  She appears well-developed and well-nourished  HENT:   Head: Normocephalic and atraumatic  Eyes: Pupils are equal, round, and reactive to light  Neck: Normal range of motion  Neck supple  No JVD present  Cardiovascular: Regular rhythm, S1 normal, S2 normal and normal pulses  Pulses:       Carotid pulses are 2+ on the right side, and 2+ on the left side  Pulmonary/Chest: Effort normal and breath sounds normal  She has no wheezes  She has no rales  Abdominal: Soft   Bowel sounds are normal  There is no tenderness  Musculoskeletal: Normal range of motion  She exhibits no edema or tenderness  Neurological: She is alert and oriented to person, place, and time  She has normal reflexes  No cranial nerve deficit  Skin: Skin is warm  Psychiatric: She has a normal mood and affect

## 2019-02-28 ENCOUNTER — TELEPHONE (OUTPATIENT)
Dept: CARDIOLOGY CLINIC | Facility: CLINIC | Age: 74
End: 2019-02-28

## 2019-03-01 ENCOUNTER — OFFICE VISIT (OUTPATIENT)
Dept: URGENT CARE | Facility: CLINIC | Age: 74
End: 2019-03-01
Payer: MEDICARE

## 2019-03-01 VITALS
TEMPERATURE: 97.7 F | RESPIRATION RATE: 18 BRPM | SYSTOLIC BLOOD PRESSURE: 179 MMHG | HEIGHT: 66 IN | DIASTOLIC BLOOD PRESSURE: 74 MMHG | OXYGEN SATURATION: 97 % | WEIGHT: 184 LBS | HEART RATE: 53 BPM | BODY MASS INDEX: 29.57 KG/M2

## 2019-03-01 DIAGNOSIS — J02.9 ACUTE PHARYNGITIS, UNSPECIFIED ETIOLOGY: Primary | ICD-10-CM

## 2019-03-01 LAB — S PYO AG THROAT QL: NEGATIVE

## 2019-03-01 PROCEDURE — 87430 STREP A AG IA: CPT | Performed by: NURSE PRACTITIONER

## 2019-03-01 PROCEDURE — 87070 CULTURE OTHR SPECIMN AEROBIC: CPT | Performed by: NURSE PRACTITIONER

## 2019-03-01 NOTE — PROGRESS NOTES
[facility]        NAME: Ramona Mccormick is a 68 y o  female  : 1945    MRN: [id number]  DATE: 2019  TIME: 10:11 AM    Assessment and Plan   Acute pharyngitis, unspecified etiology [J02 9]  1  Acute pharyngitis, unspecified etiology  POCT rapid strepA    Throat culture         Patient Instructions   May use any of the following for symptomatic control of sore throat symptoms:  Tea with honey, saltwater gargles, lozenges, Chloraseptic spray  Follow up with PCP in 3-5 days  Proceed to  ER if symptoms worsen  Chief Complaint     Chief Complaint   Patient presents with    Sore Throat     Pt c/o sore throat and body aches that stared yesterday  History of Present Illness       Sore Throat    This is a new problem  The current episode started yesterday  There has been no fever  Associated symptoms include coughing, headaches, a hoarse voice, swollen glands and trouble swallowing  Pertinent negatives include no congestion, shortness of breath or stridor  She has tried nothing for the symptoms  Review of Systems   Review of Systems   Constitutional: Positive for fatigue  HENT: Positive for hoarse voice, sore throat and trouble swallowing  Negative for congestion  Eyes: Negative  Respiratory: Positive for cough  Negative for choking, chest tightness, shortness of breath, wheezing and stridor  Cardiovascular: Negative  Gastrointestinal: Negative  Genitourinary: Negative  Musculoskeletal: Negative  Neurological: Positive for headaches  Psychiatric/Behavioral: Negative            Current Medications       Current Outpatient Medications:     acetaminophen (TYLENOL) 325 mg tablet, Take 2 tablets by mouth every 6 (six) hours as needed for headaches, Disp: 30 tablet, Rfl: 0    ALENDRONATE SODIUM PO, Take 70 mg by mouth weekly , Disp: , Rfl:     amLODIPine (NORVASC) 5 mg tablet, Take 5 mg by mouth daily, Disp: , Rfl:     aspirin (ECOTRIN LOW STRENGTH) 81 mg EC tablet, Take 81 mg by mouth daily, Disp: , Rfl:     atenolol (TENORMIN) 25 mg tablet, Take 1 tablet by mouth daily, Disp: , Rfl:     Biotin 10 MG TABS, Take by mouth, Disp: , Rfl:     calcium carbonate (OS-SUSAN) 600 MG tablet, Take 1,200 mg by mouth daily, Disp: , Rfl:     Cholecalciferol (VITAMIN D) 2000 units CAPS, Take by mouth, Disp: , Rfl:     ezetimibe (ZETIA) 10 mg tablet, Take 10 mg by mouth daily, Disp: , Rfl:     Nedra, Zingiber officinalis, (NEDRA PO), Take 500 mg by mouth daily, Disp: , Rfl:     MAGNESIUM PO, Take 250 mg by mouth daily, Disp: , Rfl:     meloxicam (MOBIC) 7 5 mg tablet, Take 7 5 mg by mouth daily, Disp: , Rfl:     Omega-3 Fatty Acids ([employer] NATURAL FISH OIL) 1000 MG CAPS, Take 2 capsules by mouth daily, Disp: , Rfl:     pantoprazole (PROTONIX) 20 mg tablet, Take 1 tablet (20 mg total) by mouth daily for 10 days (Patient not taking: Reported on 2/27/2019), Disp: 10 tablet, Rfl: 0    psyllium (METAMUCIL) 58 6 % packet, Take 1 packet by mouth daily, Disp: , Rfl:     Red Yeast Rice Extract (RED YEAST RICE PO), Take 2,400 mg by mouth daily, Disp: , Rfl:     TURMERIC PO, Take 538 mg by mouth daily, Disp: , Rfl:     Current Allergies     Allergies as of 03/01/2019 - Reviewed 03/01/2019   Allergen Reaction Noted    Niacin Other (See Comments) 03/11/2015    Penicillins  02/12/2017            The following portions of the patient's history were reviewed and updated as appropriate: allergies, current medications, past family history, past medical history, past social history, past surgical history and problem list      Past Medical History:   Diagnosis Date    Arthritis     Back pain     Breast CA ([address]     Cancer of kidney ([address]     Claustrophobia     Diverticula of colon     Ductal carcinoma in situ of breast     Hyperlipidemia     Hypertension     Kidney stone     Myocardial ischemia     Renal mass     Right bundle branch block     Stress incontinence Past Surgical History:   Procedure Laterality Date    BREAST SURGERY      left  breast lumpectomy-benign    GANGLION CYST EXCISION      HYSTERECTOMY      KIDNEY SURGERY      left kidney removal    NEPHRECTOMY Left     cancer    PLANTAR FASCIECTOMY Left     REPLACEMENT TOTAL KNEE Bilateral     SKIN CANCER EXCISION      melanoma removed left forearm and right lower back       No family history on file  Medications have been verified  Objective   BP (!) 179/74 (BP Location: Left arm, Patient Position: Sitting)   Pulse (!) 53   Temp 97 7 °F (36 5 °C)   Resp 18   Ht 5' 6" (1 676 m)   Wt 83 5 kg (184 lb)   SpO2 97%   BMI 29 70 kg/m²        Physical Exam     Physical Exam   Constitutional: She is oriented to person, place, and time  She appears well-developed and well-nourished  Non-toxic appearance  She does not appear ill  No distress  HENT:   Head: Normocephalic and atraumatic  Right Ear: Hearing, tympanic membrane and ear canal normal    Left Ear: Hearing, tympanic membrane and ear canal normal    Mouth/Throat: Posterior oropharyngeal edema and posterior oropharyngeal erythema present  No oropharyngeal exudate  Tonsils are 1+ on the right  Tonsils are 1+ on the left  No tonsillar exudate  Eyes: Pupils are equal, round, and reactive to light  EOM are normal    Neck: Normal range of motion  Neck supple  No thyromegaly present  Cardiovascular: Normal rate, regular rhythm, normal heart sounds and intact distal pulses  Abdominal: Soft  Bowel sounds are normal    Lymphadenopathy:     She has cervical adenopathy  Neurological: She is alert and oriented to person, place, and time  Skin: Skin is warm and dry  Psychiatric: She has a normal mood and affect  Her behavior is normal    Nursing note and vitals reviewed

## 2019-03-01 NOTE — PATIENT INSTRUCTIONS

## 2019-03-03 LAB — BACTERIA THROAT CULT: NORMAL

## 2019-05-09 ENCOUNTER — HOSPITAL ENCOUNTER (EMERGENCY)
Facility: HOSPITAL | Age: 74
Discharge: HOME | End: 2019-05-09
Attending: EMERGENCY MEDICINE
Payer: MEDICARE

## 2019-05-09 ENCOUNTER — APPOINTMENT (EMERGENCY)
Dept: CT IMAGING | Facility: HOSPITAL | Age: 74
End: 2019-05-09
Payer: MEDICARE

## 2019-05-09 VITALS
BODY MASS INDEX: 29.58 KG/M2 | SYSTOLIC BLOOD PRESSURE: 147 MMHG | HEIGHT: 66 IN | DIASTOLIC BLOOD PRESSURE: 61 MMHG | OXYGEN SATURATION: 99 % | HEART RATE: 64 BPM | RESPIRATION RATE: 17 BRPM | TEMPERATURE: 97.4 F | WEIGHT: 184.08 LBS

## 2019-05-09 DIAGNOSIS — R51.9 HEADACHE: Primary | ICD-10-CM

## 2019-05-09 PROCEDURE — 70450 CT HEAD/BRAIN W/O DYE: CPT

## 2019-05-09 PROCEDURE — 99284 EMERGENCY DEPT VISIT MOD MDM: CPT

## 2019-05-09 RX ORDER — METOCLOPRAMIDE 5 MG/1
10 TABLET ORAL ONCE
Status: COMPLETED | OUTPATIENT
Start: 2019-05-09 | End: 2019-05-09

## 2019-05-09 RX ORDER — ACETAMINOPHEN 325 MG/1
975 TABLET ORAL ONCE
Status: COMPLETED | OUTPATIENT
Start: 2019-05-09 | End: 2019-05-09

## 2019-05-09 RX ORDER — UBIDECARENONE 200 MG
200 CAPSULE ORAL DAILY
COMMUNITY
End: 2020-01-08

## 2019-05-09 RX ADMIN — METOCLOPRAMIDE HYDROCHLORIDE 10 MG: 5 TABLET ORAL at 12:28

## 2019-05-09 RX ADMIN — ACETAMINOPHEN 975 MG: 325 TABLET ORAL at 12:28

## 2019-06-12 RX ORDER — DICYCLOMINE HYDROCHLORIDE 10 MG/1
CAPSULE ORAL
COMMUNITY
End: 2019-06-19 | Stop reason: ALTCHOICE

## 2019-06-12 RX ORDER — SERTRALINE HYDROCHLORIDE 100 MG/1
TABLET, FILM COATED ORAL
COMMUNITY
End: 2019-06-19 | Stop reason: ALTCHOICE

## 2019-06-12 RX ORDER — NIACIN 100 MG
TABLET ORAL
COMMUNITY
End: 2019-06-19 | Stop reason: ALTCHOICE

## 2019-06-12 RX ORDER — CYCLOBENZAPRINE HCL 10 MG
TABLET ORAL
COMMUNITY
End: 2019-06-19 | Stop reason: ALTCHOICE

## 2019-06-12 RX ORDER — IRON,CARBONYL/ASCORBIC ACID 100-250 MG
TABLET ORAL
COMMUNITY
End: 2019-06-19 | Stop reason: ALTCHOICE

## 2019-06-12 RX ORDER — LEVOFLOXACIN 500 MG/1
TABLET, FILM COATED ORAL
COMMUNITY
End: 2019-06-19 | Stop reason: ALTCHOICE

## 2019-06-12 RX ORDER — AMITRIPTYLINE HYDROCHLORIDE 10 MG/1
TABLET, FILM COATED ORAL
COMMUNITY

## 2019-06-12 RX ORDER — DIAZEPAM 2 MG/1
TABLET ORAL
COMMUNITY
End: 2019-06-19 | Stop reason: ALTCHOICE

## 2019-06-12 RX ORDER — B-COMPLEX WITH VITAMIN C
TABLET ORAL
COMMUNITY
End: 2020-01-08

## 2019-06-12 RX ORDER — AMOXICILLIN 500 MG
CAPSULE ORAL
COMMUNITY
End: 2019-06-19 | Stop reason: ALTCHOICE

## 2019-06-12 RX ORDER — ASCORBIC ACID 500 MG
TABLET ORAL
COMMUNITY
End: 2019-06-19 | Stop reason: ALTCHOICE

## 2019-06-12 RX ORDER — METHOCARBAMOL 750 MG/1
TABLET, FILM COATED ORAL
COMMUNITY
End: 2019-06-19 | Stop reason: ALTCHOICE

## 2019-06-12 RX ORDER — CALCIUM CARBONATE 260MG(650)
TABLET,CHEWABLE ORAL
COMMUNITY
End: 2019-06-19 | Stop reason: ALTCHOICE

## 2019-06-19 ENCOUNTER — OFFICE VISIT (OUTPATIENT)
Dept: UROLOGY | Facility: MEDICAL CENTER | Age: 74
End: 2019-06-19
Payer: MEDICARE

## 2019-06-19 VITALS
BODY MASS INDEX: 30.22 KG/M2 | WEIGHT: 188 LBS | HEIGHT: 66 IN | DIASTOLIC BLOOD PRESSURE: 88 MMHG | HEART RATE: 61 BPM | SYSTOLIC BLOOD PRESSURE: 120 MMHG

## 2019-06-19 DIAGNOSIS — Z87.442 HISTORY OF NEPHROLITHIASIS: ICD-10-CM

## 2019-06-19 DIAGNOSIS — Z85.528 HISTORY OF KIDNEY CANCER: Primary | ICD-10-CM

## 2019-06-19 DIAGNOSIS — Z98.890 STATUS POST ROBOT-ASSISTED SURGICAL PROCEDURE: ICD-10-CM

## 2019-06-19 DIAGNOSIS — Z90.5 HISTORY OF LEFT RADICAL NEPHRECTOMY: ICD-10-CM

## 2019-06-19 DIAGNOSIS — N28.1 RENAL CYST, RIGHT: ICD-10-CM

## 2019-06-19 LAB
SL AMB  POCT GLUCOSE, UA: ABNORMAL
SL AMB LEUKOCYTE ESTERASE,UA: ABNORMAL
SL AMB POCT BILIRUBIN,UA: ABNORMAL
SL AMB POCT BLOOD,UA: ABNORMAL
SL AMB POCT CLARITY,UA: CLEAR
SL AMB POCT COLOR,UA: YELLOW
SL AMB POCT KETONES,UA: ABNORMAL
SL AMB POCT NITRITE,UA: ABNORMAL
SL AMB POCT PH,UA: 5.5
SL AMB POCT SPECIFIC GRAVITY,UA: 1.02
SL AMB POCT URINE PROTEIN: ABNORMAL
SL AMB POCT UROBILINOGEN: 0.2

## 2019-06-19 PROCEDURE — 99214 OFFICE O/P EST MOD 30 MIN: CPT | Performed by: UROLOGY

## 2019-06-19 PROCEDURE — 81003 URINALYSIS AUTO W/O SCOPE: CPT | Performed by: UROLOGY

## 2019-06-19 RX ORDER — HYDROCODONE BITARTRATE AND ACETAMINOPHEN 5; 325 MG/1; MG/1
TABLET ORAL
COMMUNITY
End: 2019-06-19 | Stop reason: ALTCHOICE

## 2019-06-19 RX ORDER — PREDNISOLONE ACETATE 10 MG/ML
SUSPENSION/ DROPS OPHTHALMIC
Refills: 0 | COMMUNITY
Start: 2019-04-08 | End: 2019-06-19 | Stop reason: ALTCHOICE

## 2019-06-19 RX ORDER — POLYMYXIN B SULFATE AND TRIMETHOPRIM 1; 10000 MG/ML; [USP'U]/ML
SOLUTION OPHTHALMIC
COMMUNITY
Start: 2019-04-08 | End: 2019-06-19 | Stop reason: ALTCHOICE

## 2019-06-21 ENCOUNTER — OFFICE VISIT (OUTPATIENT)
Dept: URGENT CARE | Facility: CLINIC | Age: 74
End: 2019-06-21
Payer: MEDICARE

## 2019-06-21 ENCOUNTER — APPOINTMENT (EMERGENCY)
Dept: RADIOLOGY | Facility: HOSPITAL | Age: 74
End: 2019-06-21
Payer: MEDICARE

## 2019-06-21 ENCOUNTER — APPOINTMENT (EMERGENCY)
Dept: CT IMAGING | Facility: HOSPITAL | Age: 74
End: 2019-06-21
Payer: MEDICARE

## 2019-06-21 ENCOUNTER — HOSPITAL ENCOUNTER (EMERGENCY)
Facility: HOSPITAL | Age: 74
Discharge: HOME | End: 2019-06-21
Payer: MEDICARE

## 2019-06-21 VITALS
TEMPERATURE: 97 F | BODY MASS INDEX: 29.89 KG/M2 | DIASTOLIC BLOOD PRESSURE: 84 MMHG | WEIGHT: 186 LBS | RESPIRATION RATE: 20 BRPM | HEIGHT: 66 IN | OXYGEN SATURATION: 97 % | HEART RATE: 67 BPM | SYSTOLIC BLOOD PRESSURE: 182 MMHG

## 2019-06-21 VITALS
HEIGHT: 66 IN | DIASTOLIC BLOOD PRESSURE: 70 MMHG | WEIGHT: 185 LBS | RESPIRATION RATE: 22 BRPM | OXYGEN SATURATION: 96 % | SYSTOLIC BLOOD PRESSURE: 161 MMHG | TEMPERATURE: 98.2 F | HEART RATE: 52 BPM | BODY MASS INDEX: 29.73 KG/M2

## 2019-06-21 DIAGNOSIS — R42 DIZZINESS: Primary | ICD-10-CM

## 2019-06-21 DIAGNOSIS — R20.2 PARESTHESIA OF LOWER LIP: ICD-10-CM

## 2019-06-21 DIAGNOSIS — I10 HYPERTENSION, UNSPECIFIED TYPE: ICD-10-CM

## 2019-06-21 DIAGNOSIS — I16.9 HYPERTENSIVE CRISIS: Primary | ICD-10-CM

## 2019-06-21 LAB
ALBUMIN SERPL BCP-MCNC: 4 G/DL (ref 3.5–5.7)
ALP SERPL-CCNC: 29 U/L (ref 55–165)
ALT SERPL W P-5'-P-CCNC: 27 U/L (ref 7–52)
ANION GAP SERPL CALCULATED.3IONS-SCNC: 7 MMOL/L (ref 4–13)
APTT PPP: 29 SECONDS (ref 26–38)
AST SERPL W P-5'-P-CCNC: 16 U/L (ref 13–39)
ATRIAL RATE: 65 BPM
BASOPHILS # BLD AUTO: 0 THOUSANDS/ΜL (ref 0–0.1)
BASOPHILS NFR BLD AUTO: 1 % (ref 0–2)
BILIRUB SERPL-MCNC: 0.6 MG/DL (ref 0.2–1)
BUN SERPL-MCNC: 32 MG/DL (ref 7–25)
CALCIUM SERPL-MCNC: 9.8 MG/DL (ref 8.6–10.5)
CHLORIDE SERPL-SCNC: 105 MMOL/L (ref 98–107)
CO2 SERPL-SCNC: 27 MMOL/L (ref 21–31)
CREAT SERPL-MCNC: 1.39 MG/DL (ref 0.6–1.2)
EOSINOPHIL # BLD AUTO: 0.1 THOUSAND/ΜL (ref 0–0.61)
EOSINOPHIL NFR BLD AUTO: 1 % (ref 0–5)
ERYTHROCYTE [DISTWIDTH] IN BLOOD BY AUTOMATED COUNT: 15 % (ref 11.5–14.5)
GFR SERPL CREATININE-BSD FRML MDRD: 37 ML/MIN/1.73SQ M
GLUCOSE SERPL-MCNC: 110 MG/DL (ref 65–140)
GLUCOSE SERPL-MCNC: 121 MG/DL (ref 65–99)
HCT VFR BLD AUTO: 39.4 % (ref 42–47)
HGB BLD-MCNC: 13.8 G/DL (ref 12–16)
INR PPP: 0.9 (ref 0.9–1.5)
LYMPHOCYTES # BLD AUTO: 1.7 THOUSANDS/ΜL (ref 0.6–4.47)
LYMPHOCYTES NFR BLD AUTO: 19 % (ref 21–51)
MCH RBC QN AUTO: 32.6 PG (ref 26–34)
MCHC RBC AUTO-ENTMCNC: 34.9 G/DL (ref 31–37)
MCV RBC AUTO: 93 FL (ref 81–99)
MONOCYTES # BLD AUTO: 0.7 THOUSAND/ΜL (ref 0.17–1.22)
MONOCYTES NFR BLD AUTO: 8 % (ref 2–12)
NEUTROPHILS # BLD AUTO: 6.3 THOUSANDS/ΜL (ref 1.4–6.5)
NEUTS SEG NFR BLD AUTO: 71 % (ref 42–75)
P AXIS: 58 DEGREES
PLATELET # BLD AUTO: 222 THOUSANDS/UL (ref 149–390)
PMV BLD AUTO: 8.3 FL (ref 8.6–11.7)
POTASSIUM SERPL-SCNC: 4.5 MMOL/L (ref 3.5–5.5)
PR INTERVAL: 154 MS
PROT SERPL-MCNC: 6.4 G/DL (ref 6.4–8.9)
PROTHROMBIN TIME: 10.4 SECONDS (ref 10.2–13)
QRS AXIS: 23 DEGREES
QRSD INTERVAL: 120 MS
QT INTERVAL: 448 MS
QTC INTERVAL: 465 MS
RBC # BLD AUTO: 4.23 MILLION/UL (ref 3.9–5.2)
SODIUM SERPL-SCNC: 139 MMOL/L (ref 134–143)
T WAVE AXIS: 5 DEGREES
TROPONIN I SERPL-MCNC: <0.03 NG/ML
VENTRICULAR RATE: 65 BPM
WBC # BLD AUTO: 8.9 THOUSAND/UL (ref 4.8–10.8)

## 2019-06-21 PROCEDURE — 71045 X-RAY EXAM CHEST 1 VIEW: CPT

## 2019-06-21 PROCEDURE — 82948 REAGENT STRIP/BLOOD GLUCOSE: CPT

## 2019-06-21 PROCEDURE — 70450 CT HEAD/BRAIN W/O DYE: CPT

## 2019-06-21 PROCEDURE — 85730 THROMBOPLASTIN TIME PARTIAL: CPT

## 2019-06-21 PROCEDURE — 80053 COMPREHEN METABOLIC PANEL: CPT

## 2019-06-21 PROCEDURE — 85025 COMPLETE CBC W/AUTO DIFF WBC: CPT

## 2019-06-21 PROCEDURE — 93005 ELECTROCARDIOGRAM TRACING: CPT

## 2019-06-21 PROCEDURE — 85610 PROTHROMBIN TIME: CPT

## 2019-06-21 PROCEDURE — 99213 OFFICE O/P EST LOW 20 MIN: CPT | Performed by: NURSE PRACTITIONER

## 2019-06-21 PROCEDURE — G0463 HOSPITAL OUTPT CLINIC VISIT: HCPCS | Performed by: NURSE PRACTITIONER

## 2019-06-21 PROCEDURE — 93010 ELECTROCARDIOGRAM REPORT: CPT | Performed by: INTERNAL MEDICINE

## 2019-06-21 PROCEDURE — 96374 THER/PROPH/DIAG INJ IV PUSH: CPT

## 2019-06-21 PROCEDURE — 36415 COLL VENOUS BLD VENIPUNCTURE: CPT

## 2019-06-21 PROCEDURE — 96375 TX/PRO/DX INJ NEW DRUG ADDON: CPT

## 2019-06-21 PROCEDURE — 84484 ASSAY OF TROPONIN QUANT: CPT

## 2019-06-21 PROCEDURE — 99285 EMERGENCY DEPT VISIT HI MDM: CPT

## 2019-06-21 RX ORDER — ONDANSETRON 2 MG/ML
4 INJECTION INTRAMUSCULAR; INTRAVENOUS ONCE
Status: COMPLETED | OUTPATIENT
Start: 2019-06-21 | End: 2019-06-21

## 2019-06-21 RX ORDER — LORAZEPAM 2 MG/ML
0.5 INJECTION INTRAMUSCULAR ONCE
Status: COMPLETED | OUTPATIENT
Start: 2019-06-21 | End: 2019-06-21

## 2019-06-21 RX ORDER — ACETAMINOPHEN 325 MG/1
975 TABLET ORAL ONCE
Status: COMPLETED | OUTPATIENT
Start: 2019-06-21 | End: 2019-06-21

## 2019-06-21 RX ORDER — HYDRALAZINE HYDROCHLORIDE 20 MG/ML
10 INJECTION INTRAMUSCULAR; INTRAVENOUS ONCE
Status: DISCONTINUED | OUTPATIENT
Start: 2019-06-21 | End: 2019-06-21 | Stop reason: HOSPADM

## 2019-06-21 RX ORDER — HYDRALAZINE HYDROCHLORIDE 20 MG/ML
10 INJECTION INTRAMUSCULAR; INTRAVENOUS ONCE
Status: COMPLETED | OUTPATIENT
Start: 2019-06-21 | End: 2019-06-21

## 2019-06-21 RX ORDER — EZETIMIBE 10 MG/1
10 TABLET ORAL DAILY
COMMUNITY

## 2019-06-21 RX ADMIN — HYDRALAZINE HYDROCHLORIDE 10 MG: 20 INJECTION INTRAMUSCULAR; INTRAVENOUS at 11:00

## 2019-06-21 RX ADMIN — ACETAMINOPHEN 650 MG: 325 TABLET ORAL at 11:13

## 2019-06-21 RX ADMIN — LORAZEPAM 0.5 MG: 2 INJECTION INTRAMUSCULAR; INTRAVENOUS at 12:21

## 2019-06-21 RX ADMIN — ONDANSETRON 4 MG: 2 INJECTION INTRAMUSCULAR; INTRAVENOUS at 12:51

## 2019-12-05 ENCOUNTER — TELEPHONE (OUTPATIENT)
Dept: UROLOGY | Facility: AMBULATORY SURGERY CENTER | Age: 74
End: 2019-12-05

## 2019-12-05 NOTE — TELEPHONE ENCOUNTER
Pt  called and would like to know if the Gabapentin that the [facility] doc at [facility] is giving his wife is going to be ok for his wife to take with her kidney functions since she only has the one kidney    Please call [phone] to discuss

## 2019-12-19 ENCOUNTER — HOSPITAL ENCOUNTER (OUTPATIENT)
Dept: RADIOLOGY | Facility: HOSPITAL | Age: 74
Discharge: HOME | End: 2019-12-19
Payer: MEDICARE

## 2019-12-19 ENCOUNTER — HOSPITAL ENCOUNTER (OUTPATIENT)
Dept: ULTRASOUND IMAGING | Facility: HOSPITAL | Age: 74
Discharge: HOME | End: 2019-12-19
Payer: MEDICARE

## 2019-12-19 DIAGNOSIS — Z87.442 HISTORY OF NEPHROLITHIASIS: ICD-10-CM

## 2019-12-19 DIAGNOSIS — Z85.528 HISTORY OF KIDNEY CANCER: ICD-10-CM

## 2019-12-19 DIAGNOSIS — Z90.5 HISTORY OF LEFT RADICAL NEPHRECTOMY: ICD-10-CM

## 2019-12-19 PROCEDURE — 76770 US EXAM ABDO BACK WALL COMP: CPT

## 2019-12-19 PROCEDURE — 71046 X-RAY EXAM CHEST 2 VIEWS: CPT

## 2019-12-23 ENCOUNTER — OFFICE VISIT (OUTPATIENT)
Dept: UROLOGY | Facility: MEDICAL CENTER | Age: 74
End: 2019-12-23
Payer: MEDICARE

## 2019-12-23 VITALS
SYSTOLIC BLOOD PRESSURE: 158 MMHG | HEIGHT: 66 IN | BODY MASS INDEX: 29.92 KG/M2 | DIASTOLIC BLOOD PRESSURE: 60 MMHG | HEART RATE: 75 BPM | WEIGHT: 186.2 LBS

## 2019-12-23 DIAGNOSIS — Z85.528 HISTORY OF KIDNEY CANCER: Primary | ICD-10-CM

## 2019-12-23 LAB
SL AMB  POCT GLUCOSE, UA: NORMAL
SL AMB LEUKOCYTE ESTERASE,UA: NORMAL
SL AMB POCT BILIRUBIN,UA: NORMAL
SL AMB POCT BLOOD,UA: NORMAL
SL AMB POCT CLARITY,UA: CLEAR
SL AMB POCT COLOR,UA: YELLOW
SL AMB POCT KETONES,UA: NORMAL
SL AMB POCT NITRITE,UA: NORMAL
SL AMB POCT PH,UA: 6
SL AMB POCT SPECIFIC GRAVITY,UA: 1.01
SL AMB POCT URINE PROTEIN: NORMAL
SL AMB POCT UROBILINOGEN: 0.2

## 2019-12-23 PROCEDURE — 99214 OFFICE O/P EST MOD 30 MIN: CPT | Performed by: UROLOGY

## 2019-12-23 PROCEDURE — 81003 URINALYSIS AUTO W/O SCOPE: CPT | Performed by: UROLOGY

## 2019-12-23 RX ORDER — CLOPIDOGREL BISULFATE 75 MG/1
75 TABLET ORAL DAILY
COMMUNITY

## 2019-12-23 RX ORDER — DEXAMETHASONE 2 MG/1
2 TABLET ORAL 2 TIMES DAILY WITH MEALS
COMMUNITY
End: 2020-01-08 | Stop reason: ALTCHOICE

## 2019-12-23 RX ORDER — GABAPENTIN 300 MG/1
300 CAPSULE ORAL 3 TIMES DAILY
COMMUNITY

## 2019-12-23 RX ORDER — PANTOPRAZOLE SODIUM 20 MG/1
20 TABLET, DELAYED RELEASE ORAL DAILY
COMMUNITY
End: 2020-01-08

## 2019-12-23 NOTE — PROGRESS NOTES
Assessment/Plan:      Diagnoses and all orders for this visit:    History of kidney cancer  -     POCT urine dip auto non-scope    Other orders  -     clopidogrel (PLAVIX) 75 mg tablet; Take 75 mg by mouth daily  -     dexamethasone (DECADRON) 2 mg tablet; Take 2 mg by mouth 2 (two) times a day with meals  -     pantoprazole (PROTONIX) 20 mg tablet; Take 20 mg by mouth daily  -     gabapentin (NEURONTIN) 300 mg capsule; Take 300 mg by mouth 3 (three) times a day      Status post robot-assisted surgical procedure     Renal cyst, right     History of nephrolithiasis         Subjective:  No complaints     Patient ID: Melonie Arango is a 76 y o  female  HPI    She is 2-1/2 years after her laparoscopic robotic left radical nephrectomy for pT1a RCC   No problems or complaints to report  Yarelis Chris is not having any flank pain and has a good appetite with bowels moving normally   She is not having pain and any new locations and has not had any unwanted weight loss   Her last radiologic imaging studies were approximately 6  months ago      She has a history of nephrolithiasis on the right side, and she has had no interval complaints concerning that history   She denies any flank pain, or hematuria  She does notice more ankle swelling when she is eating salty food and when she is last active and more static physically  She suffered a CVA right after I saw her in June  She is currently mostly recovered from that, but still has some slurred speech and tingling and numbness in her left hand  Review of Systems   Constitutional: Negative  HENT: Negative  Eyes: Positive for visual disturbance  Respiratory: Negative  Cardiovascular: Positive for leg swelling  Gastrointestinal: Negative  Endocrine: Negative  Genitourinary: Negative  Musculoskeletal: Negative  Skin: Negative  Allergic/Immunologic: Negative  Neurological: Positive for facial asymmetry, speech difficulty and numbness  Hematological: Negative  Psychiatric/Behavioral: Negative  Objective:     Physical Exam   Constitutional: She is oriented to person, place, and time  She appears well-developed and well-nourished  No distress  HENT:   Head: Normocephalic and atraumatic  Nose: Nose normal    Mouth/Throat: Oropharynx is clear and moist    Eyes: Pupils are equal, round, and reactive to light  Conjunctivae and EOM are normal  No scleral icterus  Neck: Normal range of motion  Neck supple  Cardiovascular: Normal rate, regular rhythm, normal heart sounds and intact distal pulses  No murmur heard  Pulmonary/Chest: Effort normal and breath sounds normal  No respiratory distress  She has no wheezes  She has no rales  Abdominal: Soft  Bowel sounds are normal  She exhibits no distension and no mass  There is no tenderness  Musculoskeletal: Normal range of motion  She exhibits no edema or tenderness  Lymphadenopathy:     She has no cervical adenopathy  Neurological: She is alert and oriented to person, place, and time  A cranial nerve deficit is present  Skin: Skin is warm and dry  No rash noted  No erythema  No pallor  Psychiatric: She has a normal mood and affect  Her behavior is normal  Judgment and thought content normal    Nursing note and vitals reviewed  Chest x-ray from 12/19/2019:  IMPRESSION:  No acute cardiopulmonary disease

## 2020-01-08 ENCOUNTER — OFFICE VISIT (OUTPATIENT)
Dept: CARDIOLOGY CLINIC | Facility: CLINIC | Age: 75
End: 2020-01-08
Payer: MEDICARE

## 2020-01-08 VITALS
DIASTOLIC BLOOD PRESSURE: 60 MMHG | HEART RATE: 80 BPM | WEIGHT: 187 LBS | HEIGHT: 66 IN | SYSTOLIC BLOOD PRESSURE: 152 MMHG | BODY MASS INDEX: 30.05 KG/M2

## 2020-01-08 DIAGNOSIS — I10 ESSENTIAL HYPERTENSION: Primary | ICD-10-CM

## 2020-01-08 DIAGNOSIS — I63.9 CEREBROVASCULAR ACCIDENT (CVA), UNSPECIFIED MECHANISM (HCC): ICD-10-CM

## 2020-01-08 DIAGNOSIS — Z90.5 S/P NEPHRECTOMY: ICD-10-CM

## 2020-01-08 DIAGNOSIS — E78.2 MIXED HYPERLIPIDEMIA: ICD-10-CM

## 2020-01-08 DIAGNOSIS — I45.10 RBBB: ICD-10-CM

## 2020-01-08 PROBLEM — Z01.810 PREOP CARDIOVASCULAR EXAM: Status: RESOLVED | Noted: 2018-09-26 | Resolved: 2020-01-08

## 2020-01-08 PROCEDURE — 99213 OFFICE O/P EST LOW 20 MIN: CPT | Performed by: INTERNAL MEDICINE

## 2020-01-09 PROBLEM — I63.9 CVA (CEREBRAL VASCULAR ACCIDENT) (HCC): Status: ACTIVE | Noted: 2020-01-09

## 2020-01-09 NOTE — PROGRESS NOTES
Cardiology Follow Up    Marissa Rowe [facility]  1945  [id number]  [facility] [place]  [address]  [phone]    1  Essential hypertension     2  RBBB     3  Mixed hyperlipidemia     4  Cerebrovascular accident (CVA), unspecified mechanism ([facility] [address] )     5  S/p nephrectomy           Discussion/Summary: All of her assessed cardiac problems are stable  I have reviewed her medications and made no changes  We did discuss increasing her Norvasc to 7 5 mg daily but will hold off for now  She will continue to monitor her BP closely at home  I encouraged her to try and get more active  RTO 6 months  Interval History: Her cardiac status has been stable  She suffered a CVA ( etiology unknown ) and was found to have a meningioma  Her SBP is running in the 140-150s  She is on Norvasc 5 mg daily  She could not tolerate Procardia  She has not had any documented AF by ECG, telemetry, 30 day event recorder  TTE was normal   She has one kidney  Creatinine runs 1 39  She is weak but denies CP, LE edema  She gets occasional lightheadedness      Patient Active Problem List   Diagnosis    Chest tightness    GERD (gastroesophageal reflux disease)    Hypertension    HLD (hyperlipidemia)    S/p nephrectomy    RBBB    Dizziness    CVA (cerebral vascular accident) [facility])     Past Medical History:   Diagnosis Date    Arthritis     Back pain     Breast CA ([facility] [address] )     Cancer of kidney ([facility] [address] )     Claustrophobia     Diverticula of colon     Ductal carcinoma in situ of breast     Hyperlipidemia     Hypertension     Kidney stone     Myocardial ischemia     Renal mass     Right bundle branch block     Stress incontinence      Social History     Socioeconomic History    Marital status: /Civil Union     Spouse name: Not on file    Number of children: Not on file    Years of education: Not on file   Andreas Barriga Highest education level: Not on file   Occupational History    Not on file   Social Needs    Financial resource strain: Not on file    Food insecurity:     Worry: Not on file     Inability: Not on file    Transportation needs:     Medical: Not on file     Non-medical: Not on file   Tobacco Use    Smoking status: Never Smoker    Smokeless tobacco: Never Used   Substance and Sexual Activity    Alcohol use: No    Drug use: No    Sexual activity: Not on file   Lifestyle    Physical activity:     Days per week: Not on file     Minutes per session: Not on file    Stress: Not on file   Relationships    Social connections:     Talks on phone: Not on file     Gets together: Not on file     Attends Protestant service: Not on file     Active member of club or organization: Not on file     Attends meetings of clubs or organizations: Not on file     Relationship status: Not on file    Intimate partner violence:     Fear of current or ex partner: Not on file     Emotionally abused: Not on file     Physically abused: Not on file     Forced sexual activity: Not on file   Other Topics Concern    Not on file   Social History Narrative    Not on file      No family history on file    Past Surgical History:   Procedure Laterality Date    BREAST SURGERY      left  breast lumpectomy-benign    GANGLION CYST EXCISION      HYSTERECTOMY      KIDNEY SURGERY      left kidney removal    NEPHRECTOMY Left     cancer    PLANTAR FASCIECTOMY Left     REPLACEMENT TOTAL KNEE Bilateral     SKIN CANCER EXCISION      melanoma removed left forearm and right lower back       Current Outpatient Medications:     ALENDRONATE SODIUM PO, Take 70 mg by mouth weekly , Disp: , Rfl:     amitriptyline (ELAVIL) 10 mg tablet, amitriptyline 10 mg tablet, Disp: , Rfl:     amLODIPine (NORVASC) 5 mg tablet, Take 5 mg by mouth daily, Disp: , Rfl:     clopidogrel (PLAVIX) 75 mg tablet, Take 75 mg by mouth daily, Disp: , Rfl:     gabapentin (NEURONTIN) 300 mg capsule, Take 300 mg by mouth 3 (three) times a day, Disp: , Rfl:     Omega-3 Fatty Acids ([employer] NATURAL FISH OIL) 1000 MG CAPS, Take 2 capsules by mouth daily, Disp: , Rfl:     atenolol (TENORMIN) 25 mg tablet, Take 1 tablet by mouth daily, Disp: , Rfl:     ezetimibe (ZETIA) 10 mg tablet, Take 10 mg by mouth daily, Disp: , Rfl:   Allergies   Allergen Reactions    Niacin Other (See Comments)     Other reaction(s): Other (See Comments)  Other reaction(s): flushed prickly sensation  Other reaction(s): flushed prickly sensation  Other reaction(s): flushed prickly sensation    Nifedipine Other (See Comments)     Lightheaded, fatigue, malaise, appetite loss     Penicillins      Vitals:    01/08/20 1529   BP: 152/60   BP Location: Right arm   Cuff Size: Standard   Pulse: 80   Weight: 84 8 kg (187 lb)   Height: 5' 6" (1 676 m)     Weight (last 2 days)     Date/Time   Weight    01/08/20 1529   84 8 (187)             Blood pressure 152/60, pulse 80, height 5' 6" (1 676 m), weight 84 8 kg (187 lb)  , Body mass index is 30 18 kg/m²  Labs:  Office Visit on 12/23/2019   Component Date Value     COLOR,UA 12/23/2019 yellow     CLARITY,UA 12/23/2019 clear     SPECIFIC GRAVITY,UA 12/23/2019 1 010      PH,UA 12/23/2019 6 0     LEUKOCYTE ESTERASE,UA 12/23/2019 neg     NITRITE,UA 12/23/2019 neg     GLUCOSE, UA 12/23/2019 neg     KETONES,UA 12/23/2019 neg     BILIRUBIN,UA 12/23/2019 neg     BLOOD,UA 12/23/2019 neg     POCT URINE PROTEIN 12/23/2019 neg     SL AMB POCT UROBILINOGEN 12/23/2019 0 2      Imaging: Xr Chest Pa & Lateral    Result Date: 12/19/2019  Narrative: CHEST INDICATION:   Z85 528: Personal history of other malignant neoplasm of kidney Z90 5: Acquired absence of kidney  COMPARISON:  Chest radiograph from 6/21/2019  Abdomen CT from 8/8/2017  EXAM PERFORMED/VIEWS:  XR CHEST PA & LATERAL FINDINGS: Cardiomediastinal silhouette appears unremarkable  The lungs are clear    No pneumothorax or pleural effusion  Osseous structures appear within normal limits for patient age  Healed left posterior 9th rib fracture  Impression: No acute cardiopulmonary disease  Workstation performed: [id number]     [facility] Kidney And Bladder    Result Date: 12/26/2019  Narrative: RENAL ULTRASOUND INDICATION:   History of urinary tract calculi  History of left renal cancer status post left nephrectomy  COMPARISON: Ultrasound 12/21/2018 TECHNIQUE:   Ultrasound of the retroperitoneum was performed with a curvilinear transducer utilizing volumetric sweeps and still imaging techniques  FINDINGS: Left kidney measures 12 1 x 5 3 cm in size Normal echogenicity and contour  No suspicious masses detected  Simple appearing 2 8 x 2 9 x 2 9 cm upper pole cyst identified as on prior exam No hydronephrosis  No shadowing calculi  No perinephric fluid collections  URETER: Nonvisualized  BLADDER: Normally distended  No focal thickening or mass lesions  Right ureteral jet visualized     Impression: 1  Status post left nephrectomy 2  Simple right renal cyst   Otherwise, unremarkable ultrasound appearance of the right kidney Workstation performed: [id number]     Review of Systems:  Review of Systems   Constitutional: Positive for fatigue  Negative for diaphoresis, fever and unexpected weight change  HENT: Negative  Respiratory: Negative for cough, shortness of breath and wheezing  Cardiovascular: Negative for chest pain, palpitations and leg swelling  Gastrointestinal: Negative for abdominal pain, diarrhea and nausea  Musculoskeletal: Negative for gait problem and myalgias  Skin: Negative for rash  Neurological: Positive for weakness and light-headedness  Negative for dizziness and numbness  Psychiatric/Behavioral: Negative  Physical Exam:  Physical Exam   Constitutional: She is oriented to person, place, and time  She appears well-developed and well-nourished  HENT:   Head: Normocephalic and atraumatic     Eyes: Pupils are equal, round, and reactive to light  Neck: Normal range of motion  Neck supple  No JVD present  Cardiovascular: Regular rhythm, S1 normal, S2 normal and normal pulses  Pulses:       Carotid pulses are 2+ on the right side, and 2+ on the left side  Pulmonary/Chest: Effort normal and breath sounds normal  She has no wheezes  She has no rales  Abdominal: Soft  Bowel sounds are normal  There is no tenderness  Musculoskeletal: Normal range of motion  She exhibits no edema or tenderness  Neurological: She is alert and oriented to person, place, and time  She has normal reflexes  No cranial nerve deficit  Skin: Skin is warm  Psychiatric: She has a normal mood and affect

## 2020-06-09 ENCOUNTER — TELEPHONE (OUTPATIENT)
Dept: UROLOGY | Facility: MEDICAL CENTER | Age: 75
End: 2020-06-09

## 2020-06-22 ENCOUNTER — TELEMEDICINE (OUTPATIENT)
Dept: UROLOGY | Facility: MEDICAL CENTER | Age: 75
End: 2020-06-22
Payer: MEDICARE

## 2020-06-22 DIAGNOSIS — Z90.5 HISTORY OF LEFT RADICAL NEPHRECTOMY: ICD-10-CM

## 2020-06-22 DIAGNOSIS — Z87.442 HISTORY OF NEPHROLITHIASIS: ICD-10-CM

## 2020-06-22 DIAGNOSIS — C64.2 MALIGNANT NEOPLASM OF LEFT KIDNEY (HCC): Primary | ICD-10-CM

## 2020-06-22 DIAGNOSIS — N28.1 RENAL CYST, RIGHT: ICD-10-CM

## 2020-06-22 DIAGNOSIS — Z98.890 STATUS POST ROBOT-ASSISTED SURGICAL PROCEDURE: ICD-10-CM

## 2020-06-22 PROCEDURE — 99215 OFFICE O/P EST HI 40 MIN: CPT | Performed by: UROLOGY

## 2020-11-16 ENCOUNTER — HOSPITAL ENCOUNTER (OUTPATIENT)
Dept: CT IMAGING | Facility: HOSPITAL | Age: 75
Discharge: HOME | End: 2020-11-16
Payer: MEDICARE

## 2020-11-16 DIAGNOSIS — N28.1 RENAL CYST, RIGHT: ICD-10-CM

## 2020-11-16 DIAGNOSIS — Z87.442 HISTORY OF NEPHROLITHIASIS: ICD-10-CM

## 2020-11-16 DIAGNOSIS — C64.2 MALIGNANT NEOPLASM OF LEFT KIDNEY (HCC): ICD-10-CM

## 2020-11-16 PROCEDURE — 74176 CT ABD & PELVIS W/O CONTRAST: CPT

## 2020-11-18 ENCOUNTER — TELEPHONE (OUTPATIENT)
Dept: UROLOGY | Facility: MEDICAL CENTER | Age: 75
End: 2020-11-18

## 2020-11-18 DIAGNOSIS — N28.1 RENAL CYST: Primary | ICD-10-CM

## 2020-12-15 ENCOUNTER — HOSPITAL ENCOUNTER (OUTPATIENT)
Dept: CT IMAGING | Facility: HOSPITAL | Age: 75
Discharge: HOME | End: 2020-12-15
Payer: MEDICARE

## 2020-12-15 DIAGNOSIS — N28.1 RENAL CYST: ICD-10-CM

## 2020-12-15 PROCEDURE — G1004 CDSM NDSC: HCPCS

## 2020-12-15 PROCEDURE — 74178 CT ABD&PLV WO CNTR FLWD CNTR: CPT

## 2020-12-15 RX ADMIN — IOHEXOL 100 ML: 350 INJECTION, SOLUTION INTRAVENOUS at 08:05

## 2024-07-17 ENCOUNTER — TELEPHONE (OUTPATIENT)
Age: 79
End: 2024-07-17

## 2024-07-17 NOTE — TELEPHONE ENCOUNTER
Caller: Rosibel     Doctor and/or Office: Areli    #: [phone]    Escalation: Appointment Patient will be a new pt would like to see Dr Stanley in [place]  Thank you

## 2024-07-18 ENCOUNTER — TELEPHONE (OUTPATIENT)
Age: 79
End: 2024-07-18

## 2024-07-18 NOTE — TELEPHONE ENCOUNTER
Caller: Nancy Costenbader    Doctor and/or Office: Jaden MARTIN#: [phone]     Escalation: Rosibel would like an appt with Dr. Stanley for nail care.  Can someone reach out to her?  Thank you.

## 2024-07-19 NOTE — TELEPHONE ENCOUNTER
Called pt back to schedule, no answer, had to leave a message asking them to call back to reschedule

## 2024-09-24 ENCOUNTER — OFFICE VISIT (OUTPATIENT)
Dept: PODIATRY | Facility: CLINIC | Age: 79
End: 2024-09-24
Payer: MEDICARE

## 2024-09-24 VITALS — WEIGHT: 218 LBS | BODY MASS INDEX: 35.19 KG/M2

## 2024-09-24 DIAGNOSIS — B35.1 ONYCHOMYCOSIS: Primary | ICD-10-CM

## 2024-09-24 DIAGNOSIS — L85.3 XEROSIS OF SKIN: ICD-10-CM

## 2024-09-24 DIAGNOSIS — R60.1 GENERALIZED EDEMA: ICD-10-CM

## 2024-09-24 DIAGNOSIS — Z79.01 LONG TERM CURRENT USE OF ANTICOAGULANT: ICD-10-CM

## 2024-09-24 DIAGNOSIS — G62.9 NEUROPATHY: ICD-10-CM

## 2024-09-24 PROCEDURE — 99202 OFFICE O/P NEW SF 15 MIN: CPT | Performed by: PODIATRIST

## 2024-09-24 PROCEDURE — 11720 DEBRIDE NAIL 1-5: CPT | Performed by: PODIATRIST

## 2024-09-24 RX ORDER — AMOXICILLIN 250 MG
1 CAPSULE ORAL DAILY
COMMUNITY

## 2024-09-24 RX ORDER — ATORVASTATIN CALCIUM 80 MG/1
80 TABLET, FILM COATED ORAL DAILY
COMMUNITY
Start: 2024-09-07

## 2024-09-24 RX ORDER — CARVEDILOL 6.25 MG/1
1 TABLET ORAL 2 TIMES DAILY WITH MEALS
COMMUNITY

## 2024-09-24 RX ORDER — DIPHENOXYLATE HYDROCHLORIDE AND ATROPINE SULFATE 2.5; .025 MG/1; MG/1
1 TABLET ORAL DAILY
COMMUNITY

## 2024-09-24 NOTE — PROGRESS NOTES
Ambulatory Visit  Name: Nancy J Costenbader      : 1945      MRN: [id number]  Encounter Provider: Beni Alvarado DPM  Encounter Date: 2024   Encounter department: [facility] [place]    Assessment & Plan  Onychomycosis       Debride mycotic nails and thin the nail plates manually with the use of a nail nipper and a electric Dremel bur was used to smooth the edges and reduce the thickness of the nailbed and nail plate.  Generalized edema         Xerosis of skin       It was suggested that she try and use lotion several times a week on both her legs and feet and patient stated that she would try.  Long term current use of anticoagulant         Neuropathy       Patient is on medication to control this. She is on gabapentin.    Return in about 11 weeks (around 12/10/2024).     History of Present Illness     Nancy J Costenbader is a 79 y.o. female who presents chief complaint of painful thick nails on both feet.  She is on long-term blood thinners along with having a stroke which gave her neuropathy in her feet.    History obtained from : patient  Review of Systems  Medical History Reviewed by provider this encounter:       Current Outpatient Medications on File Prior to Visit   Medication Sig Dispense Refill    amitriptyline (ELAVIL) 10 mg tablet amitriptyline 10 mg tablet      amLODIPine (NORVASC) 5 mg tablet Take 5 mg by mouth daily      atorvastatin (LIPITOR) 80 mg tablet Take 80 mg by mouth daily      carvedilol (COREG) 6.25 mg tablet Take 1 tablet by mouth 2 (two) times a day with meals      clopidogrel (PLAVIX) 75 mg tablet Take 75 mg by mouth daily      dexamethasone (DECADRON) 2 mg tablet Take 2 mg by mouth 2 (two) times a day with meals      ezetimibe (ZETIA) 10 mg tablet Take 10 mg by mouth daily      gabapentin (NEURONTIN) 300 mg capsule Take 300 mg by mouth 3 (three) times a day      multivitamin (THERAGRAN) TABS Take 1 tablet by mouth daily      Omega-3 Fatty Acids ([employer] NATURAL  FISH OIL) 1000 MG CAPS Take 2 capsules by mouth daily      pantoprazole (PROTONIX) 20 mg tablet Take 20 mg by mouth daily      senna-docusate sodium (SENOKOT S) 8.6-50 mg per tablet Take 1 tablet by mouth daily      ALENDRONATE SODIUM PO Take 70 mg by mouth weekly  (Patient not taking: Reported on 9/24/2024)      atenolol (TENORMIN) 25 mg tablet Take 1 tablet by mouth daily (Patient not taking: Reported on 9/24/2024)       No current facility-administered medications on file prior to visit.          Objective     Wt 98.9 kg (218 lb)   BMI 35.19 kg/m²     Physical Exam  Vascular status is 1/4 DP PT negative digital hair normal distal cooling immediate capillary refill bilaterally.  There is edema present bilaterally it is +1 on the legs and feet.    Derm nails are brittle elongated hypertrophic yellow-white discoloration with subungual debris x 2.  There is an increased thickness in the nails were approximately 2 mm.  There is white flaky tissue present on the dorsal and plantar aspect of both feet going up into the legs also.  Negative dry skin or fissures are noted.    Ortho hammertoe deformities are present on the third and fourth toes bilaterally and the fifth toes in a slight adductovarus position.    Neuro light touch was diminished bilaterally and is 0.5 monofilament test was absent at most sites.  The sites that were tested were the plantar aspect of the hallux, plantar aspect of the third and fourth toes, submet 3, and the arch area plantarly    Administrative Statements   I have spent a total time of 17 minutes in caring for this patient on the day of the visit/encounter including Risks and benefits of tx options, Instructions for management, Patient and family education, Importance of tx compliance, Counseling / Coordination of care, Documenting in the medical record, Reviewing / ordering tests, medicine, procedures  , and Obtaining or reviewing history  .

## 2024-12-10 ENCOUNTER — OFFICE VISIT (OUTPATIENT)
Dept: PODIATRY | Facility: CLINIC | Age: 79
End: 2024-12-10
Payer: MEDICARE

## 2024-12-10 VITALS
DIASTOLIC BLOOD PRESSURE: 74 MMHG | SYSTOLIC BLOOD PRESSURE: 149 MMHG | HEART RATE: 65 BPM | BODY MASS INDEX: 34.54 KG/M2 | WEIGHT: 214 LBS

## 2024-12-10 DIAGNOSIS — M79.675 PAIN IN TOES OF BOTH FEET: ICD-10-CM

## 2024-12-10 DIAGNOSIS — L85.3 XEROSIS OF SKIN: ICD-10-CM

## 2024-12-10 DIAGNOSIS — B35.1 ONYCHOMYCOSIS: ICD-10-CM

## 2024-12-10 DIAGNOSIS — M79.674 PAIN IN TOES OF BOTH FEET: ICD-10-CM

## 2024-12-10 DIAGNOSIS — Z79.01 LONG TERM CURRENT USE OF ANTICOAGULANT: Primary | ICD-10-CM

## 2024-12-10 PROCEDURE — RECHECK: Performed by: PODIATRIST

## 2024-12-10 PROCEDURE — 11721 DEBRIDE NAIL 6 OR MORE: CPT | Performed by: PODIATRIST

## 2024-12-10 NOTE — PROGRESS NOTES
Name: Nancy J Costenbader      : 1945      MRN: [id number]  Encounter Provider: Beni Alvarado DPM  Encounter Date: 12/10/2024   Encounter department: [facility] [place]  :  Assessment & Plan  Long term current use of anticoagulant         Onychomycosis       Debride mycotic nails and thin the nail plates x 6 with the use of a nail nipper manually and an electric Dremel bur was used to reduce the thickness of the nail beds and smoothed the distal aspect of the nails.   Pain in toes of both feet         Xerosis of skin       Pt was instructed to use lotion once a day on both feet such as cerave, Cetaphil or similar thick style of lotion.     Discussed proper shoe gear, daily inspections of feet, and general foot health with patient. Patient has Q8  findings and is recommended for at risk foot care every 9-10 weeks.    Patients most recent complete clinical foot exam was on: 2024      Return in about 10 weeks (around 2025).     History of Present Illness   HPI  Nancy J Costenbader is a 79 y.o. female who presents with chief complaint of painful thick nails on both feet.Patient presents for at-risk foot care.  Patient has no acute concerns today.  Patient has significant lower extremity risk due to neuropathy, parasthesia, edema, and trophic skin changes to the lower extremity.   History obtained from: patient    Review of Systems  Medical History Reviewed by provider this encounter:     .  Current Outpatient Medications on File Prior to Visit   Medication Sig Dispense Refill    amitriptyline (ELAVIL) 10 mg tablet amitriptyline 10 mg tablet      amLODIPine (NORVASC) 5 mg tablet Take 5 mg by mouth daily      atorvastatin (LIPITOR) 80 mg tablet Take 80 mg by mouth daily      carvedilol (COREG) 6.25 mg tablet Take 1 tablet by mouth 2 (two) times a day with meals      clopidogrel (PLAVIX) 75 mg tablet Take 75 mg by mouth daily      dexamethasone (DECADRON) 2 mg tablet Take 2 mg by mouth  2 (two) times a day with meals      ezetimibe (ZETIA) 10 mg tablet Take 10 mg by mouth daily      gabapentin (NEURONTIN) 300 mg capsule Take 300 mg by mouth 3 (three) times a day      multivitamin (THERAGRAN) TABS Take 1 tablet by mouth daily      Omega-3 Fatty Acids ([employer] NATURAL FISH OIL) 1000 MG CAPS Take 2 capsules by mouth daily      pantoprazole (PROTONIX) 20 mg tablet Take 20 mg by mouth daily      senna-docusate sodium (SENOKOT S) 8.6-50 mg per tablet Take 1 tablet by mouth daily      ALENDRONATE SODIUM PO Take 70 mg by mouth weekly  (Patient not taking: Reported on 9/24/2024)      atenolol (TENORMIN) 25 mg tablet Take 1 tablet by mouth daily (Patient not taking: Reported on 9/24/2024)       No current facility-administered medications on file prior to visit.      Social History     Tobacco Use    Smoking status: Never    Smokeless tobacco: Never   Substance and Sexual Activity    Alcohol use: No    Drug use: No    Sexual activity: Not on file        Objective   /74 (BP Location: Right arm, Patient Position: Sitting, Cuff Size: Large)   Pulse 65   Wt 97.1 kg (214 lb)   BMI 34.54 kg/m²      Physical Exam  Vascular status is a faint one fourth DP PT negative digital hair normal distal cooling immediate capillary refill bilaterally.  Capillary refill is approximately 2 to 3 seconds.    Derm nails are brittle elongated hypertrophic white-yellow discoloration with subungual debris x 6.  There is an increased thickness and the nails are approximately 1 to 2 mm.  There is white flaky tissue present on the lower extremity as well as the dorsal and plantar aspects of both feet.  There is loss of turgor noted bilaterally and thinning to the skin also seen bilaterally.    Ortho and neuro exam are unchanged from her last visit on 9/24/2024.  Administrative Statements   I have spent a total time of 15 minutes in caring for this patient on the day of the visit/encounter including Risks and benefits of tx options,  Instructions for management, Patient and family education, Importance of tx compliance, Risk factor reductions, Counseling / Coordination of care, and Documenting in the medical record.

## 2025-02-25 ENCOUNTER — OFFICE VISIT (OUTPATIENT)
Dept: PODIATRY | Facility: CLINIC | Age: 80
End: 2025-02-25
Payer: MEDICARE

## 2025-02-25 VITALS — BODY MASS INDEX: 34.39 KG/M2 | HEIGHT: 66 IN | WEIGHT: 214 LBS

## 2025-02-25 DIAGNOSIS — M79.674 PAIN IN TOES OF BOTH FEET: ICD-10-CM

## 2025-02-25 DIAGNOSIS — Z79.01 LONG TERM CURRENT USE OF ANTICOAGULANT: Primary | ICD-10-CM

## 2025-02-25 DIAGNOSIS — B35.1 ONYCHOMYCOSIS: ICD-10-CM

## 2025-02-25 DIAGNOSIS — M79.675 PAIN IN TOES OF BOTH FEET: ICD-10-CM

## 2025-02-25 PROCEDURE — RECHECK: Performed by: PODIATRIST

## 2025-02-25 PROCEDURE — 11721 DEBRIDE NAIL 6 OR MORE: CPT | Performed by: PODIATRIST

## 2025-02-28 NOTE — PROGRESS NOTES
Name: Nancy J Costenbader      : 1945      MRN: [id number]  Encounter Provider: Beni Alvarado DPM  Encounter Date: 2025   Encounter department: [facility] PODIAT[facility] [place]  :  Assessment & Plan  Long term current use of anticoagulant         Onychomycosis       Debride mycotic nails and thin the nail plates x 6 with the use of a nail nipper manually and an electric Dremel bur was used to reduce the thickness of the nail beds and smoothed the distal aspect of the nails.   Pain in toes of both feet         Discussed proper shoe gear, daily inspections of feet, and general foot health with patient. Patient has Q8  findings and is recommended for at risk foot care every 9-10 weeks.    Patients most recent complete clinical foot exam was on: 12/10/2024      Return in about 10 weeks (around 2025).     History of Present Illness   HPI  Nancy J Costenbader is a 79 y.o. female who presents with chief complaint of painful thick nails on both feet.  She is on long-term anticoagulation therapy.Patient presents for at-risk foot care.  Patient has no acute concerns today.  Patient has significant lower extremity risk due to neuropathy, parasthesia, edema, and trophic skin changes to the lower extremity.   History obtained from: patient    Review of Systems  Medical History Reviewed by provider this encounter:     .  Current Outpatient Medications on File Prior to Visit   Medication Sig Dispense Refill    amitriptyline (ELAVIL) 10 mg tablet amitriptyline 10 mg tablet      amLODIPine (NORVASC) 5 mg tablet Take 5 mg by mouth daily      atorvastatin (LIPITOR) 80 mg tablet Take 80 mg by mouth daily      carvedilol (COREG) 6.25 mg tablet Take 1 tablet by mouth 2 (two) times a day with meals      clopidogrel (PLAVIX) 75 mg tablet Take 75 mg by mouth daily      dexamethasone (DECADRON) 2 mg tablet Take 2 mg by mouth 2 (two) times a day with meals      ezetimibe (ZETIA) 10 mg tablet Take 10 mg by mouth daily    "   gabapentin (NEURONTIN) 300 mg capsule Take 300 mg by mouth 3 (three) times a day      multivitamin (THERAGRAN) TABS Take 1 tablet by mouth daily      Omega-3 Fatty Acids ([employer] NATURAL FISH OIL) 1000 MG CAPS Take 2 capsules by mouth daily      pantoprazole (PROTONIX) 20 mg tablet Take 20 mg by mouth daily      senna-docusate sodium (SENOKOT S) 8.6-50 mg per tablet Take 1 tablet by mouth daily      ALENDRONATE SODIUM PO Take 70 mg by mouth weekly  (Patient not taking: Reported on 9/24/2024)      atenolol (TENORMIN) 25 mg tablet Take 1 tablet by mouth daily (Patient not taking: Reported on 9/24/2024)       No current facility-administered medications on file prior to visit.      Social History     Tobacco Use    Smoking status: Never    Smokeless tobacco: Never   Substance and Sexual Activity    Alcohol use: No    Drug use: No    Sexual activity: Not on file        Objective   Ht 5' 6\" (1.676 m)   Wt 97.1 kg (214 lb)   BMI 34.54 kg/m²      Physical Exam  Vascular status is a faint 1/4 DP PT negative digital hair, delayed capillary refill, and normal distal cooling bilaterally.  Capillary refill is approximately 3 seconds today.    Derm nails are brittle, elongated, hypertrophic, white-yellow discoloration with subungual debris x 6.  There is an increased thickness and the nails are approximately 1 to 2 mm.  The dry flaky tissue that had been present on both feet has improved slightly since her last visit.  There is loss of turgor noted bilaterally and thinning of the skin is also seen.    Ortho and neuro are unchanged from last visit.  Administrative Statements   I have spent a total time of 15 minutes in caring for this patient on the day of the visit/encounter including Risks and benefits of tx options, Instructions for management, Patient and family education, Importance of tx compliance, Counseling / Coordination of care, Documenting in the medical record, and Obtaining or reviewing history  .  "

## 2025-05-20 ENCOUNTER — PROCEDURE VISIT (OUTPATIENT)
Dept: PODIATRY | Facility: CLINIC | Age: 80
End: 2025-05-20
Payer: MEDICARE

## 2025-05-20 VITALS — WEIGHT: 210 LBS | BODY MASS INDEX: 33.75 KG/M2 | HEIGHT: 66 IN

## 2025-05-20 DIAGNOSIS — Z79.01 LONG TERM CURRENT USE OF ANTICOAGULANT: ICD-10-CM

## 2025-05-20 DIAGNOSIS — M79.674 PAIN IN TOES OF BOTH FEET: ICD-10-CM

## 2025-05-20 DIAGNOSIS — M79.675 PAIN IN TOES OF BOTH FEET: ICD-10-CM

## 2025-05-20 DIAGNOSIS — G62.9 NEUROPATHY: ICD-10-CM

## 2025-05-20 DIAGNOSIS — B35.1 ONYCHOMYCOSIS: Primary | ICD-10-CM

## 2025-05-20 PROCEDURE — 11721 DEBRIDE NAIL 6 OR MORE: CPT | Performed by: PODIATRIST

## 2025-05-20 NOTE — PROGRESS NOTES
"Name: Nancy J Costenbader      : 1945      MRN: [id number]  Encounter Provider: Beni Alvarado DPM  Encounter Date: 2025   Encounter department: [facility] [place]  :  Assessment & Plan  Onychomycosis       Debride mycotic nails and thin the nail plates x 6 with the use of a nail nipper manually and an electric Dremel bur was used to reduce the thickness of the nail beds and smoothed the distal aspect of the nails.   Long term current use of anticoagulant         Neuropathy         Pain in toes of both feet         Pt was instructed to use lotion once a day on both feet such as cerave, Cetaphil or similar thick style of lotion.   Discussed proper shoe gear, daily inspections of feet, and general foot health with patient. Patient has Q8  findings and is recommended for at risk foot care every 9-10 weeks.    Patients most recent complete clinical foot exam was on: 2025      History of Present Illness   HPI  Nancy J Costenbader is a 79 y.o. female who presents with chief complaint of painful thick nails on both feet.  She is on long-term anticoagulant therapy.  Patient presents for at-risk foot care.  Patient has no acute concerns today.  Patient has significant lower extremity risk due to neuropathy, parasthesia, edema, and trophic skin changes to the lower extremity.   History obtained from: patient    Review of Systems  Medical History Reviewed by provider this encounter:     .  Medications Ordered Prior to Encounter[1]   Social History     Tobacco Use    Smoking status: Never    Smokeless tobacco: Never   Substance and Sexual Activity    Alcohol use: No    Drug use: No    Sexual activity: Not on file        Objective   Ht 5' 6\" (1.676 m)   Wt 95.3 kg (210 lb)   BMI 33.89 kg/m²      Physical Exam  Vascular status is a faint 1/4 DP PT negative digital hair, delayed capillary refill, and normal distal cooling bilaterally.  Capillary refill is approximately 3 seconds today.  There " is slight edema present bilaterally +1 pitting.     Derm nails are brittle, elongated, hypertrophic, white-yellow discoloration with subungual debris x 6.  There is an increased thickness and the nails are approximately 1 to 2 mm.  The dry flaky tissue that had been present on both feet.  There is loss of turgor noted bilaterally and thinning of the skin.       [1]   Current Outpatient Medications on File Prior to Visit   Medication Sig Dispense Refill    amitriptyline (ELAVIL) 10 mg tablet       amLODIPine (NORVASC) 5 mg tablet Take 5 mg by mouth in the morning.      atorvastatin (LIPITOR) 80 mg tablet Take 80 mg by mouth in the morning.      carvedilol (COREG) 6.25 mg tablet Take 1 tablet by mouth in the morning and 1 tablet in the evening. Take with meals.      clopidogrel (PLAVIX) 75 mg tablet Take 75 mg by mouth in the morning.      dexamethasone (DECADRON) 2 mg tablet Take 2 mg by mouth in the morning and 2 mg in the evening. Take with meals.      ezetimibe (ZETIA) 10 mg tablet Take 10 mg by mouth in the morning.      gabapentin (NEURONTIN) 300 mg capsule Take 300 mg by mouth in the morning and 300 mg in the evening and 300 mg before bedtime.      multivitamin (THERAGRAN) TABS Take 1 tablet by mouth in the morning.      Omega-3 Fatty Acids ([employer] NATURAL FISH OIL) 1000 MG CAPS Take 2 capsules by mouth in the morning.      pantoprazole (PROTONIX) 20 mg tablet Take 20 mg by mouth in the morning.      senna-docusate sodium (SENOKOT S) 8.6-50 mg per tablet Take 1 tablet by mouth in the morning.      ALENDRONATE SODIUM PO Take 70 mg by mouth weekly  (Patient not taking: Reported on 9/24/2024)      atenolol (TENORMIN) 25 mg tablet Take 1 tablet by mouth daily (Patient not taking: Reported on 9/24/2024)       No current facility-administered medications on file prior to visit.

## 2025-08-19 ENCOUNTER — PROCEDURE VISIT (OUTPATIENT)
Dept: PODIATRY | Facility: CLINIC | Age: 80
End: 2025-08-19
Payer: MEDICARE

## 2025-08-19 VITALS — HEIGHT: 66 IN | BODY MASS INDEX: 34.23 KG/M2 | WEIGHT: 213 LBS

## 2025-08-19 DIAGNOSIS — M79.675 PAIN IN TOES OF BOTH FEET: ICD-10-CM

## 2025-08-19 DIAGNOSIS — B35.1 ONYCHOMYCOSIS: Primary | ICD-10-CM

## 2025-08-19 DIAGNOSIS — G62.9 NEUROPATHY: ICD-10-CM

## 2025-08-19 DIAGNOSIS — M79.674 PAIN IN TOES OF BOTH FEET: ICD-10-CM

## 2025-08-19 DIAGNOSIS — Z79.01 LONG TERM CURRENT USE OF ANTICOAGULANT: ICD-10-CM

## 2025-08-19 PROCEDURE — 11721 DEBRIDE NAIL 6 OR MORE: CPT | Performed by: PODIATRIST
